# Patient Record
Sex: MALE | Race: WHITE | NOT HISPANIC OR LATINO | Employment: OTHER | URBAN - METROPOLITAN AREA
[De-identification: names, ages, dates, MRNs, and addresses within clinical notes are randomized per-mention and may not be internally consistent; named-entity substitution may affect disease eponyms.]

---

## 2022-09-12 RX ORDER — TRIAMTERENE AND HYDROCHLOROTHIAZIDE 75; 50 MG/1; MG/1
1 TABLET ORAL DAILY
COMMUNITY

## 2022-09-12 RX ORDER — LISINOPRIL 10 MG/1
10 TABLET ORAL DAILY
COMMUNITY

## 2022-09-12 RX ORDER — TRAMADOL HYDROCHLORIDE 50 MG/1
50 TABLET ORAL 2 TIMES DAILY
Status: ON HOLD | COMMUNITY
End: 2022-09-28

## 2022-09-19 NOTE — PROVIDER NOTIFICATION
Discharge plan according to [facility]:       09/12/22 1048   Discharge Planning   Patient/Family Anticipates Transition to home   Living Arrangements   People in Home child(jennifer), adult   Type of Residence Private Residence   Number of Stairs, Within Home, Primary none   Once home, are you able to live on one level? Yes   Bathroom Shower/Tub Walk-in shower   Equipment Currently Used at Home cane, straight  (uses occasionally d/t pain)   Support System   Support Systems Children   Medical Clearance   [facility] Name [facility].

## 2022-09-23 ENCOUNTER — LAB (OUTPATIENT)
Dept: LAB | Facility: CLINIC | Age: 62
End: 2022-09-23
Attending: FAMILY MEDICINE
Payer: COMMERCIAL

## 2022-09-23 DIAGNOSIS — Z01.818 PRE-OPERATIVE GENERAL PHYSICAL EXAMINATION: ICD-10-CM

## 2022-09-23 PROCEDURE — U0005 INFEC AGEN DETEC AMPLI PROBE: HCPCS

## 2022-09-23 PROCEDURE — U0003 INFECTIOUS AGENT DETECTION BY NUCLEIC ACID (DNA OR RNA); SEVERE ACUTE RESPIRATORY SYNDROME CORONAVIRUS 2 (SARS-COV-2) (CORONAVIRUS DISEASE [COVID-19]), AMPLIFIED PROBE TECHNIQUE, MAKING USE OF HIGH THROUGHPUT TECHNOLOGIES AS DESCRIBED BY CMS-2020-01-R: HCPCS

## 2022-09-24 LAB — SARS-COV-2 RNA RESP QL NAA+PROBE: NEGATIVE

## 2022-09-26 ENCOUNTER — ANESTHESIA EVENT (OUTPATIENT)
Dept: SURGERY | Facility: CLINIC | Age: 62
End: 2022-09-26
Payer: COMMERCIAL

## 2022-09-26 NOTE — PROGRESS NOTES
I am evaluating this patient for upcoming Right Direct Anterior Total Hip Arthroplasty with Dr. Baugh at [facility] on 9/27/22:    - Reviewed preop H&P and labs: cleared by PCP for surgery. PMH significant for:   1) Hypertension- appears well-controlled on lisinopril and triamterene-hydrochlorothiazide (patient instructed to hold both of these medications on the morning of surgery).   2) Hypercholesterolemia: on atorvastatin.   3) Morbid Obesity- BMI 43.2, Wt: 328 lbs at preop exam.  Recommend continued efforts at safe weight loss following recovery from this surgery. If patient is interested in further assistance with weight loss, please consider referral to [facility]. They offer both non-surgical and surgical evidence-based weight loss strategies. Call [phone] to schedule a consultation to learn more.      Patient appears medically optimized for surgery. Discharge Plan below.    Discharge plan according to [facility]:       09/12/22 1048   Discharge Planning   Patient/Family Anticipates Transition to home   Living Arrangements   People in Home child(jennifer), adult   Type of Residence Private Residence   Number of Stairs, Within Home, Primary none   Once home, are you able to live on one level? Yes   Bathroom Shower/Tub Walk-in shower   Equipment Currently Used at Home cane, straight  (uses occasionally d/t pain)   Support System   Support Systems Children   Medical Clearance   [facility] Name [facility].              NAIMA Shipman, CNP   Advanced Practice Nurse Navigator- Orthopedics  [facility]   Office Phone: [phone]  Direct Fax: [phone]

## 2022-09-27 ENCOUNTER — ANESTHESIA (OUTPATIENT)
Dept: SURGERY | Facility: CLINIC | Age: 62
End: 2022-09-27
Payer: COMMERCIAL

## 2022-09-27 ENCOUNTER — APPOINTMENT (OUTPATIENT)
Dept: RADIOLOGY | Facility: CLINIC | Age: 62
End: 2022-09-27
Attending: PHYSICIAN ASSISTANT
Payer: COMMERCIAL

## 2022-09-27 ENCOUNTER — APPOINTMENT (OUTPATIENT)
Dept: RADIOLOGY | Facility: CLINIC | Age: 62
End: 2022-09-27
Attending: ORTHOPAEDIC SURGERY
Payer: COMMERCIAL

## 2022-09-27 ENCOUNTER — APPOINTMENT (OUTPATIENT)
Dept: PHYSICAL THERAPY | Facility: CLINIC | Age: 62
End: 2022-09-27
Attending: ORTHOPAEDIC SURGERY
Payer: COMMERCIAL

## 2022-09-27 ENCOUNTER — HOSPITAL ENCOUNTER (OUTPATIENT)
Facility: CLINIC | Age: 62
Discharge: HOME | End: 2022-09-28
Attending: ORTHOPAEDIC SURGERY | Admitting: ORTHOPAEDIC SURGERY
Payer: COMMERCIAL

## 2022-09-27 DIAGNOSIS — Z96.641 STATUS POST TOTAL REPLACEMENT OF RIGHT HIP: Primary | ICD-10-CM

## 2022-09-27 PROBLEM — I10 PRIMARY HYPERTENSION: Status: ACTIVE | Noted: 2022-09-27

## 2022-09-27 PROBLEM — M16.11 PRIMARY OSTEOARTHRITIS OF RIGHT HIP: Status: ACTIVE | Noted: 2022-09-27

## 2022-09-27 PROBLEM — E78.2 MIXED HYPERLIPIDEMIA: Status: ACTIVE | Noted: 2022-09-27

## 2022-09-27 PROBLEM — Z96.651 STATUS POST TOTAL RIGHT KNEE REPLACEMENT: Status: ACTIVE | Noted: 2022-09-27

## 2022-09-27 LAB
ABO/RH(D): NORMAL
ANTIBODY SCREEN: NEGATIVE
SPECIMEN EXPIRATION DATE: NORMAL

## 2022-09-27 PROCEDURE — 250N000013 HC RX MED GY IP 250 OP 250 PS 637: Performed by: HOSPITALIST

## 2022-09-27 PROCEDURE — 250N000011 HC RX IP 250 OP 636: Performed by: ANESTHESIOLOGY

## 2022-09-27 PROCEDURE — 250N000009 HC RX 250: Performed by: PHYSICIAN ASSISTANT

## 2022-09-27 PROCEDURE — 258N000003 HC RX IP 258 OP 636: Performed by: NURSE ANESTHETIST, CERTIFIED REGISTERED

## 2022-09-27 PROCEDURE — 258N000003 HC RX IP 258 OP 636: Performed by: ANESTHESIOLOGY

## 2022-09-27 PROCEDURE — 99204 OFFICE O/P NEW MOD 45 MIN: CPT | Performed by: HOSPITALIST

## 2022-09-27 PROCEDURE — 250N000009 HC RX 250: Performed by: NURSE ANESTHETIST, CERTIFIED REGISTERED

## 2022-09-27 PROCEDURE — 250N000011 HC RX IP 250 OP 636: Performed by: NURSE ANESTHETIST, CERTIFIED REGISTERED

## 2022-09-27 PROCEDURE — 250N000011 HC RX IP 250 OP 636: Performed by: PHYSICIAN ASSISTANT

## 2022-09-27 PROCEDURE — 999N000065 XR PELVIS AND HIP PORTABLE RIGHT 1 VIEW

## 2022-09-27 PROCEDURE — 999N000180 XR SURGERY CARM FLUORO LESS THAN 5 MIN: Mod: TC

## 2022-09-27 PROCEDURE — 250N000009 HC RX 250: Performed by: ANESTHESIOLOGY

## 2022-09-27 PROCEDURE — 999N000141 HC STATISTIC PRE-PROCEDURE NURSING ASSESSMENT: Performed by: ORTHOPAEDIC SURGERY

## 2022-09-27 PROCEDURE — 258N000003 HC RX IP 258 OP 636: Performed by: PHYSICIAN ASSISTANT

## 2022-09-27 PROCEDURE — 370N000017 HC ANESTHESIA TECHNICAL FEE, PER MIN: Performed by: ORTHOPAEDIC SURGERY

## 2022-09-27 PROCEDURE — 272N000001 HC OR GENERAL SUPPLY STERILE: Performed by: ORTHOPAEDIC SURGERY

## 2022-09-27 PROCEDURE — 710N000010 HC RECOVERY PHASE 1, LEVEL 2, PER MIN: Performed by: ORTHOPAEDIC SURGERY

## 2022-09-27 PROCEDURE — C1713 ANCHOR/SCREW BN/BN,TIS/BN: HCPCS | Performed by: ORTHOPAEDIC SURGERY

## 2022-09-27 PROCEDURE — 97110 THERAPEUTIC EXERCISES: CPT | Mod: GP

## 2022-09-27 PROCEDURE — 250N000013 HC RX MED GY IP 250 OP 250 PS 637: Performed by: PHYSICIAN ASSISTANT

## 2022-09-27 PROCEDURE — 86901 BLOOD TYPING SEROLOGIC RH(D): CPT | Performed by: PHYSICIAN ASSISTANT

## 2022-09-27 PROCEDURE — 36415 COLL VENOUS BLD VENIPUNCTURE: CPT | Performed by: PHYSICIAN ASSISTANT

## 2022-09-27 PROCEDURE — 250N000009 HC RX 250: Performed by: ORTHOPAEDIC SURGERY

## 2022-09-27 PROCEDURE — 99205 OFFICE O/P NEW HI 60 MIN: CPT | Performed by: NURSE PRACTITIONER

## 2022-09-27 PROCEDURE — 97116 GAIT TRAINING THERAPY: CPT | Mod: GP

## 2022-09-27 PROCEDURE — C1776 JOINT DEVICE (IMPLANTABLE): HCPCS | Performed by: ORTHOPAEDIC SURGERY

## 2022-09-27 PROCEDURE — 97161 PT EVAL LOW COMPLEX 20 MIN: CPT | Mod: GP

## 2022-09-27 PROCEDURE — 360N000084 HC SURGERY LEVEL 4 W/ FLUORO, PER MIN: Performed by: ORTHOPAEDIC SURGERY

## 2022-09-27 PROCEDURE — 258N000001 HC RX 258: Performed by: ORTHOPAEDIC SURGERY

## 2022-09-27 DEVICE — PINNACLE GRIPTION ACETABULAR SHELL SECTOR 58MM OD
Type: IMPLANTABLE DEVICE | Site: HIP | Status: FUNCTIONAL
Brand: PINNACLE GRIPTION

## 2022-09-27 DEVICE — PINNACLE CANCELLOUS BONE SCREW 6.5MM X 25MM
Type: IMPLANTABLE DEVICE | Site: HIP | Status: FUNCTIONAL
Brand: PINNACLE

## 2022-09-27 DEVICE — BIOLOX DELTA CERAMIC FEMORAL HEAD +8.5 36MM DIA 12/14 TAPER
Type: IMPLANTABLE DEVICE | Site: HIP | Status: FUNCTIONAL
Brand: BIOLOX DELTA

## 2022-09-27 DEVICE — PINNACLE CANCELLOUS BONE SCREW 6.5MM X 20MM
Type: IMPLANTABLE DEVICE | Site: HIP | Status: FUNCTIONAL
Brand: PINNACLE

## 2022-09-27 DEVICE — ACTIS DUOFIX HIP PROSTHESIS (FEMORAL STEM 12/14 TAPER CEMENTLESS SIZE 8 STD COLLAR)  CE
Type: IMPLANTABLE DEVICE | Site: HIP | Status: FUNCTIONAL
Brand: ACTIS

## 2022-09-27 DEVICE — PINNACLE HIP SOLUTIONS ALTRX POLYETHYLENE ACETABULAR LINER +4 NEUTRAL 36MM ID 58MM OD
Type: IMPLANTABLE DEVICE | Site: HIP | Status: FUNCTIONAL
Brand: PINNACLE ALTRX

## 2022-09-27 RX ORDER — ACETAMINOPHEN 500 MG
500 TABLET ORAL EVERY 4 HOURS PRN
Status: ON HOLD | COMMUNITY
End: 2022-09-28

## 2022-09-27 RX ORDER — ACETAMINOPHEN 325 MG/1
975 TABLET ORAL EVERY 8 HOURS
Status: DISCONTINUED | OUTPATIENT
Start: 2022-09-27 | End: 2022-09-28 | Stop reason: HOSPADM

## 2022-09-27 RX ORDER — TRANEXAMIC ACID 650 MG/1
1950 TABLET ORAL ONCE
Status: COMPLETED | OUTPATIENT
Start: 2022-09-27 | End: 2022-09-27

## 2022-09-27 RX ORDER — GABAPENTIN 300 MG/1
300 CAPSULE ORAL AT BEDTIME
Status: DISCONTINUED | OUTPATIENT
Start: 2022-09-27 | End: 2022-09-27

## 2022-09-27 RX ORDER — ONDANSETRON 2 MG/ML
4 INJECTION INTRAMUSCULAR; INTRAVENOUS EVERY 6 HOURS PRN
Status: DISCONTINUED | OUTPATIENT
Start: 2022-09-27 | End: 2022-09-28 | Stop reason: HOSPADM

## 2022-09-27 RX ORDER — CELECOXIB 200 MG/1
400 CAPSULE ORAL ONCE
Status: COMPLETED | OUTPATIENT
Start: 2022-09-27 | End: 2022-09-27

## 2022-09-27 RX ORDER — KETOROLAC TROMETHAMINE 10 MG/1
10 TABLET, FILM COATED ORAL EVERY 8 HOURS
Qty: 6 TABLET | Refills: 0 | Status: SHIPPED | OUTPATIENT
Start: 2022-09-27 | End: 2022-09-29

## 2022-09-27 RX ORDER — BUPIVACAINE HYDROCHLORIDE 5 MG/ML
INJECTION, SOLUTION EPIDURAL; INTRACAUDAL
Status: DISCONTINUED
Start: 2022-09-27 | End: 2022-09-27 | Stop reason: HOSPADM

## 2022-09-27 RX ORDER — HYDROMORPHONE HCL IN WATER/PF 6 MG/30 ML
0.2 PATIENT CONTROLLED ANALGESIA SYRINGE INTRAVENOUS EVERY 5 MIN PRN
Status: DISCONTINUED | OUTPATIENT
Start: 2022-09-27 | End: 2022-09-27 | Stop reason: HOSPADM

## 2022-09-27 RX ORDER — MULTIPLE VITAMINS W/ MINERALS TAB 9MG-400MCG
1 TAB ORAL DAILY
COMMUNITY

## 2022-09-27 RX ORDER — KETAMINE HYDROCHLORIDE 10 MG/ML
INJECTION INTRAMUSCULAR; INTRAVENOUS PRN
Status: DISCONTINUED | OUTPATIENT
Start: 2022-09-27 | End: 2022-09-27

## 2022-09-27 RX ORDER — ACETAMINOPHEN 325 MG/1
650 TABLET ORAL EVERY 4 HOURS PRN
Qty: 100 TABLET | Refills: 0 | Status: SHIPPED | OUTPATIENT
Start: 2022-09-27

## 2022-09-27 RX ORDER — NALOXONE HYDROCHLORIDE 0.4 MG/ML
0.2 INJECTION, SOLUTION INTRAMUSCULAR; INTRAVENOUS; SUBCUTANEOUS
Status: DISCONTINUED | OUTPATIENT
Start: 2022-09-27 | End: 2022-09-28 | Stop reason: HOSPADM

## 2022-09-27 RX ORDER — VITAMIN B COMPLEX
1 TABLET ORAL DAILY
COMMUNITY

## 2022-09-27 RX ORDER — GABAPENTIN 100 MG/1
300 CAPSULE ORAL AT BEDTIME
Qty: 30 CAPSULE | Refills: 0 | Status: SHIPPED | OUTPATIENT
Start: 2022-09-27 | End: 2022-09-28

## 2022-09-27 RX ORDER — KETOROLAC TROMETHAMINE 15 MG/ML
15 INJECTION, SOLUTION INTRAMUSCULAR; INTRAVENOUS EVERY 6 HOURS
Status: COMPLETED | OUTPATIENT
Start: 2022-09-27 | End: 2022-09-28

## 2022-09-27 RX ORDER — DEXAMETHASONE SODIUM PHOSPHATE 10 MG/ML
INJECTION, SOLUTION INTRAMUSCULAR; INTRAVENOUS PRN
Status: DISCONTINUED | OUTPATIENT
Start: 2022-09-27 | End: 2022-09-27

## 2022-09-27 RX ORDER — ACETAMINOPHEN 325 MG/1
975 TABLET ORAL ONCE
Status: DISCONTINUED | OUTPATIENT
Start: 2022-09-27 | End: 2022-09-27 | Stop reason: HOSPADM

## 2022-09-27 RX ORDER — EPHEDRINE SULFATE 50 MG/ML
INJECTION, SOLUTION INTRAMUSCULAR; INTRAVENOUS; SUBCUTANEOUS PRN
Status: DISCONTINUED | OUTPATIENT
Start: 2022-09-27 | End: 2022-09-27

## 2022-09-27 RX ORDER — TRIAMTERENE AND HYDROCHLOROTHIAZIDE 75; 50 MG/1; MG/1
1 TABLET ORAL DAILY
Status: DISCONTINUED | OUTPATIENT
Start: 2022-09-27 | End: 2022-09-28 | Stop reason: HOSPADM

## 2022-09-27 RX ORDER — SODIUM CHLORIDE, SODIUM LACTATE, POTASSIUM CHLORIDE, CALCIUM CHLORIDE 600; 310; 30; 20 MG/100ML; MG/100ML; MG/100ML; MG/100ML
INJECTION, SOLUTION INTRAVENOUS CONTINUOUS
Status: DISCONTINUED | OUTPATIENT
Start: 2022-09-27 | End: 2022-09-27 | Stop reason: HOSPADM

## 2022-09-27 RX ORDER — HYDROMORPHONE HCL IN WATER/PF 6 MG/30 ML
0.2 PATIENT CONTROLLED ANALGESIA SYRINGE INTRAVENOUS
Status: DISCONTINUED | OUTPATIENT
Start: 2022-09-27 | End: 2022-09-28 | Stop reason: HOSPADM

## 2022-09-27 RX ORDER — OXYCODONE HYDROCHLORIDE 5 MG/1
10 TABLET ORAL EVERY 4 HOURS PRN
Status: DISCONTINUED | OUTPATIENT
Start: 2022-09-27 | End: 2022-09-28 | Stop reason: HOSPADM

## 2022-09-27 RX ORDER — OXYCODONE HYDROCHLORIDE 5 MG/1
5 TABLET ORAL EVERY 4 HOURS PRN
Status: DISCONTINUED | OUTPATIENT
Start: 2022-09-27 | End: 2022-09-28 | Stop reason: HOSPADM

## 2022-09-27 RX ORDER — LIDOCAINE 40 MG/G
CREAM TOPICAL
Status: DISCONTINUED | OUTPATIENT
Start: 2022-09-27 | End: 2022-09-28 | Stop reason: HOSPADM

## 2022-09-27 RX ORDER — AMOXICILLIN 250 MG
1 CAPSULE ORAL 2 TIMES DAILY
Status: DISCONTINUED | OUTPATIENT
Start: 2022-09-27 | End: 2022-09-28 | Stop reason: HOSPADM

## 2022-09-27 RX ORDER — OXYCODONE HYDROCHLORIDE 5 MG/1
5 TABLET ORAL EVERY 4 HOURS PRN
Status: DISCONTINUED | OUTPATIENT
Start: 2022-09-27 | End: 2022-09-27 | Stop reason: HOSPADM

## 2022-09-27 RX ORDER — HYDROXYZINE HYDROCHLORIDE 25 MG/1
25 TABLET, FILM COATED ORAL EVERY 6 HOURS PRN
Status: DISCONTINUED | OUTPATIENT
Start: 2022-09-27 | End: 2022-09-28 | Stop reason: HOSPADM

## 2022-09-27 RX ORDER — ONDANSETRON 2 MG/ML
4 INJECTION INTRAMUSCULAR; INTRAVENOUS EVERY 30 MIN PRN
Status: DISCONTINUED | OUTPATIENT
Start: 2022-09-27 | End: 2022-09-27 | Stop reason: HOSPADM

## 2022-09-27 RX ORDER — LISINOPRIL 10 MG/1
10 TABLET ORAL DAILY
Status: DISCONTINUED | OUTPATIENT
Start: 2022-09-27 | End: 2022-09-28 | Stop reason: HOSPADM

## 2022-09-27 RX ORDER — CEFAZOLIN SODIUM/WATER 3 G/30 ML
3 SYRINGE (ML) INTRAVENOUS
Status: COMPLETED | OUTPATIENT
Start: 2022-09-27 | End: 2022-09-27

## 2022-09-27 RX ORDER — MAGNESIUM HYDROXIDE 1200 MG/15ML
LIQUID ORAL PRN
Status: DISCONTINUED | OUTPATIENT
Start: 2022-09-27 | End: 2022-09-27 | Stop reason: HOSPADM

## 2022-09-27 RX ORDER — HYDROXYZINE HYDROCHLORIDE 25 MG/1
25 TABLET, FILM COATED ORAL EVERY 6 HOURS PRN
Qty: 30 TABLET | Refills: 0 | Status: SHIPPED | OUTPATIENT
Start: 2022-09-27

## 2022-09-27 RX ORDER — ASPIRIN 81 MG/1
81 TABLET ORAL 2 TIMES DAILY
Qty: 60 TABLET | Refills: 0 | Status: SHIPPED | OUTPATIENT
Start: 2022-09-27

## 2022-09-27 RX ORDER — AMOXICILLIN 250 MG
1-2 CAPSULE ORAL 2 TIMES DAILY PRN
Qty: 30 TABLET | Refills: 0 | Status: SHIPPED | OUTPATIENT
Start: 2022-09-27

## 2022-09-27 RX ORDER — ACETAMINOPHEN 325 MG/1
650 TABLET ORAL EVERY 4 HOURS PRN
Status: DISCONTINUED | OUTPATIENT
Start: 2022-09-30 | End: 2022-09-28 | Stop reason: HOSPADM

## 2022-09-27 RX ORDER — PROCHLORPERAZINE MALEATE 10 MG
10 TABLET ORAL EVERY 6 HOURS PRN
Status: DISCONTINUED | OUTPATIENT
Start: 2022-09-27 | End: 2022-09-28 | Stop reason: HOSPADM

## 2022-09-27 RX ORDER — ONDANSETRON 2 MG/ML
INJECTION INTRAMUSCULAR; INTRAVENOUS PRN
Status: DISCONTINUED | OUTPATIENT
Start: 2022-09-27 | End: 2022-09-27

## 2022-09-27 RX ORDER — OXYCODONE HYDROCHLORIDE 5 MG/1
5 TABLET ORAL EVERY 4 HOURS PRN
Qty: 26 TABLET | Refills: 0 | Status: SHIPPED | OUTPATIENT
Start: 2022-09-27

## 2022-09-27 RX ORDER — NYSTATIN 100000 [USP'U]/G
POWDER TOPICAL 2 TIMES DAILY PRN
Qty: 60 G | Refills: 0 | Status: SHIPPED | OUTPATIENT
Start: 2022-09-27

## 2022-09-27 RX ORDER — DEXMEDETOMIDINE HYDROCHLORIDE 4 UG/ML
.1-1.2 INJECTION, SOLUTION INTRAVENOUS CONTINUOUS
Status: DISCONTINUED | OUTPATIENT
Start: 2022-09-27 | End: 2022-09-27 | Stop reason: HOSPADM

## 2022-09-27 RX ORDER — MULTIPLE VITAMINS W/ MINERALS TAB 9MG-400MCG
1 TAB ORAL DAILY
Status: DISCONTINUED | OUTPATIENT
Start: 2022-09-27 | End: 2022-09-28 | Stop reason: HOSPADM

## 2022-09-27 RX ORDER — NALOXONE HYDROCHLORIDE 0.4 MG/ML
0.4 INJECTION, SOLUTION INTRAMUSCULAR; INTRAVENOUS; SUBCUTANEOUS
Status: DISCONTINUED | OUTPATIENT
Start: 2022-09-27 | End: 2022-09-28 | Stop reason: HOSPADM

## 2022-09-27 RX ORDER — ONDANSETRON 4 MG/1
4 TABLET, ORALLY DISINTEGRATING ORAL EVERY 6 HOURS PRN
Status: DISCONTINUED | OUTPATIENT
Start: 2022-09-27 | End: 2022-09-28 | Stop reason: HOSPADM

## 2022-09-27 RX ORDER — NAPROXEN 250 MG/1
250 TABLET ORAL DAILY
COMMUNITY

## 2022-09-27 RX ORDER — HYDROMORPHONE HCL IN WATER/PF 6 MG/30 ML
0.4 PATIENT CONTROLLED ANALGESIA SYRINGE INTRAVENOUS
Status: DISCONTINUED | OUTPATIENT
Start: 2022-09-27 | End: 2022-09-28 | Stop reason: HOSPADM

## 2022-09-27 RX ORDER — CEFADROXIL 500 MG/1
500 CAPSULE ORAL 2 TIMES DAILY
Qty: 14 CAPSULE | Refills: 0 | Status: SHIPPED | OUTPATIENT
Start: 2022-09-27

## 2022-09-27 RX ORDER — MAGNESIUM SULFATE 4 G/50ML
4 INJECTION INTRAVENOUS ONCE
Status: COMPLETED | OUTPATIENT
Start: 2022-09-27 | End: 2022-09-27

## 2022-09-27 RX ORDER — LIDOCAINE 40 MG/G
CREAM TOPICAL
Status: DISCONTINUED | OUTPATIENT
Start: 2022-09-27 | End: 2022-09-27 | Stop reason: HOSPADM

## 2022-09-27 RX ORDER — FENTANYL CITRATE 50 UG/ML
INJECTION, SOLUTION INTRAMUSCULAR; INTRAVENOUS PRN
Status: DISCONTINUED | OUTPATIENT
Start: 2022-09-27 | End: 2022-09-27

## 2022-09-27 RX ORDER — BISACODYL 10 MG
10 SUPPOSITORY, RECTAL RECTAL DAILY PRN
Status: DISCONTINUED | OUTPATIENT
Start: 2022-09-27 | End: 2022-09-28 | Stop reason: HOSPADM

## 2022-09-27 RX ORDER — ONDANSETRON 4 MG/1
4 TABLET, ORALLY DISINTEGRATING ORAL EVERY 30 MIN PRN
Status: DISCONTINUED | OUTPATIENT
Start: 2022-09-27 | End: 2022-09-27 | Stop reason: HOSPADM

## 2022-09-27 RX ORDER — BUPIVACAINE HYDROCHLORIDE 5 MG/ML
INJECTION, SOLUTION EPIDURAL; INTRACAUDAL
Status: COMPLETED | OUTPATIENT
Start: 2022-09-27 | End: 2022-09-27

## 2022-09-27 RX ORDER — PROPOFOL 10 MG/ML
INJECTION, EMULSION INTRAVENOUS CONTINUOUS PRN
Status: DISCONTINUED | OUTPATIENT
Start: 2022-09-27 | End: 2022-09-27

## 2022-09-27 RX ORDER — CEFAZOLIN SODIUM 2 G/100ML
2 INJECTION, SOLUTION INTRAVENOUS EVERY 8 HOURS
Status: COMPLETED | OUTPATIENT
Start: 2022-09-27 | End: 2022-09-28

## 2022-09-27 RX ORDER — ASPIRIN 81 MG/1
81 TABLET ORAL 2 TIMES DAILY
Status: DISCONTINUED | OUTPATIENT
Start: 2022-09-27 | End: 2022-09-28 | Stop reason: HOSPADM

## 2022-09-27 RX ORDER — ZINC GLUCONATE 50 MG
50 TABLET ORAL DAILY
COMMUNITY

## 2022-09-27 RX ORDER — MAGNESIUM SULFATE 4 G/50ML
4 INJECTION INTRAVENOUS ONCE
Status: DISCONTINUED | OUTPATIENT
Start: 2022-09-27 | End: 2022-09-27 | Stop reason: HOSPADM

## 2022-09-27 RX ORDER — POLYETHYLENE GLYCOL 3350 17 G/17G
17 POWDER, FOR SOLUTION ORAL DAILY
Status: DISCONTINUED | OUTPATIENT
Start: 2022-09-28 | End: 2022-09-28 | Stop reason: HOSPADM

## 2022-09-27 RX ORDER — FENTANYL CITRATE 50 UG/ML
25 INJECTION, SOLUTION INTRAMUSCULAR; INTRAVENOUS EVERY 5 MIN PRN
Status: DISCONTINUED | OUTPATIENT
Start: 2022-09-27 | End: 2022-09-27 | Stop reason: HOSPADM

## 2022-09-27 RX ORDER — CEFAZOLIN SODIUM/WATER 3 G/30 ML
3 SYRINGE (ML) INTRAVENOUS SEE ADMIN INSTRUCTIONS
Status: DISCONTINUED | OUTPATIENT
Start: 2022-09-27 | End: 2022-09-27 | Stop reason: HOSPADM

## 2022-09-27 RX ORDER — SODIUM CHLORIDE, SODIUM LACTATE, POTASSIUM CHLORIDE, CALCIUM CHLORIDE 600; 310; 30; 20 MG/100ML; MG/100ML; MG/100ML; MG/100ML
INJECTION, SOLUTION INTRAVENOUS CONTINUOUS
Status: DISCONTINUED | OUTPATIENT
Start: 2022-09-27 | End: 2022-09-28 | Stop reason: HOSPADM

## 2022-09-27 RX ADMIN — Medication 1 CAPSULE: at 19:55

## 2022-09-27 RX ADMIN — MULTIPLE VITAMINS W/ MINERALS TAB 1 TABLET: TAB at 15:50

## 2022-09-27 RX ADMIN — ASPIRIN 81 MG: 81 TABLET, COATED ORAL at 20:01

## 2022-09-27 RX ADMIN — SENNOSIDES AND DOCUSATE SODIUM 1 TABLET: 50; 8.6 TABLET ORAL at 20:00

## 2022-09-27 RX ADMIN — Medication 5 MG: at 09:54

## 2022-09-27 RX ADMIN — CEFAZOLIN SODIUM 2 G: 2 INJECTION, SOLUTION INTRAVENOUS at 23:51

## 2022-09-27 RX ADMIN — SODIUM CHLORIDE, POTASSIUM CHLORIDE, SODIUM LACTATE AND CALCIUM CHLORIDE: 600; 310; 30; 20 INJECTION, SOLUTION INTRAVENOUS at 13:04

## 2022-09-27 RX ADMIN — BUPIVACAINE HYDROCHLORIDE 3 ML: 5 INJECTION, SOLUTION EPIDURAL; INTRACAUDAL; PERINEURAL at 07:57

## 2022-09-27 RX ADMIN — Medication 10 MG: at 09:40

## 2022-09-27 RX ADMIN — PHENYLEPHRINE HYDROCHLORIDE 100 MCG: 10 INJECTION INTRAVENOUS at 08:23

## 2022-09-27 RX ADMIN — DEXAMETHASONE SODIUM PHOSPHATE 10 MG: 10 INJECTION, SOLUTION INTRAMUSCULAR; INTRAVENOUS at 08:46

## 2022-09-27 RX ADMIN — MAGNESIUM SULFATE HEPTAHYDRATE 4 G: 80 INJECTION, SOLUTION INTRAVENOUS at 06:44

## 2022-09-27 RX ADMIN — FENTANYL CITRATE 50 MCG: 50 INJECTION, SOLUTION INTRAMUSCULAR; INTRAVENOUS at 07:50

## 2022-09-27 RX ADMIN — KETAMINE HYDROCHLORIDE 30 MG: 10 INJECTION, SOLUTION INTRAMUSCULAR; INTRAVENOUS at 08:04

## 2022-09-27 RX ADMIN — Medication 5 MG: at 10:09

## 2022-09-27 RX ADMIN — MIDAZOLAM 2 MG: 1 INJECTION INTRAMUSCULAR; INTRAVENOUS at 07:40

## 2022-09-27 RX ADMIN — KETOROLAC TROMETHAMINE 15 MG: 15 INJECTION, SOLUTION INTRAMUSCULAR; INTRAVENOUS at 19:55

## 2022-09-27 RX ADMIN — PROPOFOL 100 MCG/KG/MIN: 10 INJECTION, EMULSION INTRAVENOUS at 07:55

## 2022-09-27 RX ADMIN — CEFAZOLIN SODIUM 2 G: 2 INJECTION, SOLUTION INTRAVENOUS at 15:49

## 2022-09-27 RX ADMIN — PHENYLEPHRINE HYDROCHLORIDE 0.5 MCG/KG/MIN: 10 INJECTION INTRAVENOUS at 07:43

## 2022-09-27 RX ADMIN — FENTANYL CITRATE 50 MCG: 50 INJECTION, SOLUTION INTRAMUSCULAR; INTRAVENOUS at 07:45

## 2022-09-27 RX ADMIN — Medication 5 MG: at 10:13

## 2022-09-27 RX ADMIN — KETOROLAC TROMETHAMINE 15 MG: 15 INJECTION, SOLUTION INTRAMUSCULAR; INTRAVENOUS at 23:51

## 2022-09-27 RX ADMIN — ACETAMINOPHEN 975 MG: 325 TABLET, FILM COATED ORAL at 22:12

## 2022-09-27 RX ADMIN — DEXMEDETOMIDINE HYDROCHLORIDE 0.3 MCG/KG/HR: 400 INJECTION INTRAVENOUS at 07:31

## 2022-09-27 RX ADMIN — SODIUM CHLORIDE, POTASSIUM CHLORIDE, SODIUM LACTATE AND CALCIUM CHLORIDE: 600; 310; 30; 20 INJECTION, SOLUTION INTRAVENOUS at 08:36

## 2022-09-27 RX ADMIN — CELECOXIB 400 MG: 200 CAPSULE ORAL at 06:04

## 2022-09-27 RX ADMIN — TRANEXAMIC ACID 1950 MG: 650 TABLET ORAL at 06:04

## 2022-09-27 RX ADMIN — ACETAMINOPHEN 975 MG: 325 TABLET, FILM COATED ORAL at 13:46

## 2022-09-27 RX ADMIN — OXYCODONE HYDROCHLORIDE 5 MG: 5 TABLET ORAL at 22:12

## 2022-09-27 RX ADMIN — SODIUM CHLORIDE, POTASSIUM CHLORIDE, SODIUM LACTATE AND CALCIUM CHLORIDE: 600; 310; 30; 20 INJECTION, SOLUTION INTRAVENOUS at 06:44

## 2022-09-27 RX ADMIN — Medication 3 G: at 07:40

## 2022-09-27 RX ADMIN — ONDANSETRON 4 MG: 2 INJECTION INTRAMUSCULAR; INTRAVENOUS at 09:31

## 2022-09-27 RX ADMIN — MAGNESIUM SULFATE HEPTAHYDRATE 4 G: 80 INJECTION, SOLUTION INTRAVENOUS at 09:58

## 2022-09-27 ASSESSMENT — ACTIVITIES OF DAILY LIVING (ADL)
ADLS_ACUITY_SCORE: 32
ADLS_ACUITY_SCORE: 32
ADLS_ACUITY_SCORE: 36
ADLS_ACUITY_SCORE: 32
ADLS_ACUITY_SCORE: 36
ADLS_ACUITY_SCORE: 32
ADLS_ACUITY_SCORE: 36
ADLS_ACUITY_SCORE: 32
ADLS_ACUITY_SCORE: 32

## 2022-09-27 NOTE — ANESTHESIA PREPROCEDURE EVALUATION
Anesthesia Pre-Procedure Evaluation    Patient: Artemio Brunson   MRN: [id number] : 1960        Procedure : Procedure(s):  RIGHT DIRECT ANTERIOR TOTAL HIP ARTHROPLASTY          Past Medical History:   Diagnosis Date     Arthritis      Hay fever      Hypertension      Irregular heart beat     PVCs     Motion sickness       History reviewed. No pertinent surgical history.   Allergies   Allergen Reactions     Animal Dander      Pollen Extract       Social History     Tobacco Use     Smoking status: Never Smoker     Smokeless tobacco: Never Used   Substance Use Topics     Alcohol use: Yes     Comment: 2-4 tray./week      Wt Readings from Last 1 Encounters:   22 147.3 kg (324 lb 12.8 oz)        Anesthesia Evaluation            ROS/MED HX  ENT/Pulmonary:     (+) CARLYN risk factors, snores loudly, hypertension, obese,     Neurologic:  - neg neurologic ROS     Cardiovascular:     (+) Dyslipidemia hypertension-----Irregular Heartbeat/Palpitations,     METS/Exercise Tolerance:     Hematologic:  - neg hematologic  ROS     Musculoskeletal:  - neg musculoskeletal ROS     GI/Hepatic:    (-) GERD   Renal/Genitourinary:  - neg Renal ROS     Endo:     (+) Obesity (BMI 43),     Psychiatric/Substance Use:  - neg psychiatric ROS     Infectious Disease:  - neg infectious disease ROS     Malignancy:       Other:  - neg other ROS          Physical Exam    Airway        Mallampati: I   TM distance: > 3 FB   Neck ROM: full   Mouth opening: > 3 cm    Respiratory Devices and Support         Dental  no notable dental history         Cardiovascular   cardiovascular exam normal       Rhythm and rate: regular and normal     Pulmonary   pulmonary exam normal        breath sounds clear to auscultation           OUTSIDE LABS:  CBC:   Lab Results   Component Value Date    WBC 6.5 2009    HGB 14.9 2009    HCT 43.2 2009     2009     BMP:   Lab Results   Component Value Date     2009    POTASSIUM  4.2 08/20/2009    CHLORIDE 104 08/20/2009    CO2 26 08/20/2009    BUN 20 08/20/2009    CR 1.00 08/20/2009    GLC 93 08/20/2009     COAGS: No results found for: PTT, INR, FIBR  POC: No results found for: BGM, HCG, HCGS  HEPATIC: No results found for: ALBUMIN, PROTTOTAL, ALT, AST, GGT, ALKPHOS, BILITOTAL, BILIDIRECT, LOUISE  OTHER:   Lab Results   Component Value Date    CHELSIE 9.4 08/20/2009       Anesthesia Plan    ASA Status:  3      Anesthesia Type: Spinal.         Techniques and Equipment:       - Drips/Meds: Dexmed. infusion, Ketamine     Consents    Anesthesia Plan(s) and associated risks, benefits, and realistic alternatives discussed. Questions answered and patient/representative(s) expressed understanding.    - Discussed:     - Discussed with:  Patient         Postoperative Care    Pain management: IV analgesics, Multi-modal analgesia.   PONV prophylaxis: Ondansetron (or other 5HT-3)     Comments:                Portillo Garcia MD

## 2022-09-27 NOTE — CONSULTS
[facility] ACUTE PAIN SERVICE CONSULTATION     Date of Admission:  9/27/2022  Date of Consult (When I saw the patient): 09/27/22  Physician requesting consult: Mia Scherer   Reason for consult: medical cannabis      Assessment/Plan:     Artemio Brunson is a 61 year old male who was admitted on 9/27/2022.  Pain team was asked to see the patient for medical cannabis. Admitted for planned procedure. History of Hypertension, Hypercholesterolemia: on atorvastatin, Morbid Obesity- BMI 43.2, Wt: 328 lbs at preop exam. Reports on medical cannabis at home, takes at night for sleep. Also on Tramadol for chronic OA pain.  Describes pain as 2-4/10 and aching. The patient denies nausea, vomiting, constipation, diarrhea, chest pain, shortness of breath. The patient does not smoke and denies chemical dependency history.     Post op day: Day of Surgery. Right Direct Anterior Total Hip Arthroplasty with Dr. Baugh at [facility] on 9/27/22    Opioid Induced Respiratory Depression Risk Assessment:?moderate - high   (Low 0-1; Moderate 2-4; or High >4 or >/= 3 if two of the risk factors are age > 60 and opioid naive) due to the following risk factors: CARLYN, COPD/Asthma/pulmonary disease, CHF, renal dysfunction, hepatic dysfunction, Obesity, Smoker, Age>60, >2 opioid therapies, concomitant CNS depressants, opioid naive status, or post surgical ?     PLAN:   1) Pain is consistent with post op pain. Labs and imaging indicated: I have personally reviewed pertinent labs, tests, and radiologic imaging in patient's chart. Treatment plan includes multimodal pain approach, Hospital Medicine Service for medical management, Orthopedics, post op cares. Patient educated regarding multimodal pain approach, medications as listed below. Patient is understanding of the plan. All questions and concerns addressed to patient's satisfaction.   2)Multimodal Medication Therapy  Topical: none  NSAID'S: Toradol per Orthopedics 15 mg x4  doses  Muscle Relaxants: none  Adjuvants: APAP tid, Gabapentin 300 mg at bedtime per Orthopedics - discontinue, Hydroxyzine 25 mg q6h prn   Opioids: Oxycodone 5-10 mg q4h prn   IV Pain medication: Dilaudid q2h prn   3)Non-medication interventions: ice, rest, pt ot  4)Constipation Prophylaxis: Scheduled and prn  5) Care Teams: Hospital Medicine Service, Orthopedics     -Opioid prescriber has been [place]  -[place]  pulled from system on 9/27/22. This indicates 4 scripts for Tramadol 50 mg in the past year, last filled 8/25/22 Tramadol 50 mg #60 for 30 d  Discharge Recommendations - We recommend prescribing the following at the time of discharge: per Ortho      History of Present Illness (HPI):       Artemio Brunson is a 61 year old old male who presented for planned procedure.  Past medical history as above. The pain is reported to be acute knee pain, chronic pain. Current pain is rated a 2-4/10 and goal is 0/10.      Per [place]  review, the patient does have an opioid tolerance. Opioid induced side effects noted and include: none.      Reviewed medical record, labs, imaging, ED note, and care everywhere.     Past pain treatments have included: APAP, Naproxen, Tramadol     Medical History   PAST MEDICAL HISTORY:   Past Medical History:   Diagnosis Date     Arthritis      Hay fever      Hypertension      Irregular heart beat     PVCs     Motion sickness        PAST SURGICAL HISTORY: History reviewed. No pertinent surgical history.    FAMILY HISTORY: History reviewed. No pertinent family history.    SOCIAL HISTORY:   Social History     Tobacco Use     Smoking status: Never Smoker     Smokeless tobacco: Never Used   Substance Use Topics     Alcohol use: Yes     Comment: 2-4 tray./week        [facility]  Tobacco:  reports that he has never smoked. He has never used smokeless tobacco.  Alcohol:  reports current alcohol use.  Illicit drugs:  reports current drug use. Drug:  "Marijuana.    Allergies  Allergies   Allergen Reactions     Animal Dander      Pollen Extract        Problem List  There are no problems to display for this patient.      Prior to Admission Medications   Medications Prior to Admission   Medication Sig Dispense Refill Last Dose     acetaminophen (TYLENOL) 500 MG tablet Take 500 mg by mouth every 4 hours as needed for mild pain   9/27/2022 at 0400     Bromelain 250 MG CAPS Take 250 mg by mouth daily   Past Week at Unknown time     lisinopril (ZESTRIL) 10 MG tablet Take 10 mg by mouth daily   9/26/2022 at Unknown time     multivitamin w/minerals (THERA-VIT-M) tablet Take 1 tablet by mouth daily   Past Week at Unknown time     naproxen (NAPROSYN) 250 MG tablet Take 250 mg by mouth daily   9/20/2022     psyllium (METAMUCIL/KONSYL) capsule Take 1 capsule by mouth daily   Past Week at Unknown time     Quercetin 250 MG TABS Take 250 mg by mouth daily   Past Week at Unknown time     traMADol (ULTRAM) 50 MG tablet Take 50 mg by mouth 2 times daily   9/27/2022 at 0400     triamterene-HCTZ (MAXZIDE) 75-50 MG tablet Take 1 tablet by mouth daily   9/26/2022 at Unknown time     vitamin B-Complex Take 1 tablet by mouth daily   Past Week at Unknown time     Vitamin D3 (CHOLECALCIFEROL) 25 mcg (1000 units) tablet Take 1 tablet by mouth daily   Past Week at Unknown time     zinc gluconate 50 MG tablet Take 50 mg by mouth daily   Past Week at Unknown time       Review of Systems  Complete ROS reviewed, unless noted in HPI, all other systems reviewed (with patient) and all others found to be negative.      Objective:     Physical Exam:  /67 (BP Location: Right arm)   Pulse 67   Temp (!) 96.7  F (35.9  C) (Axillary)   Resp 18   Ht 1.854 m (6' 1\")   Wt 147.3 kg (324 lb 12.8 oz)   SpO2 98%   BMI 42.85 kg/m    Weight:   Vitals:    09/12/22 0900 09/27/22 0615   Weight: 148.8 kg (328 lb) 147.3 kg (324 lb 12.8 oz)      Body mass index is 42.85 kg/m .    General Appearance:  Alert, " cooperative, no distress, appears stated age    Head:  Normocephalic, without obvious abnormality, atraumatic   Eyes:  PERRL, conjunctiva/corneas clear, EOM's intact   ENT/Throat: Lips, mucosa, and tongue normal; teeth and gums normal   Lymph/Neck: Supple, symmetrical, trachea midline   Lungs:   Clear to auscultation bilaterally, respirations unlabored   Chest Wall:  No tenderness or deformity   Cardiovascular/Heart:  Regular rate and rhythm, S1, S2 normal,no murmur, rub or gallop.    Abdomen:   Soft, non-tender, bowel sounds active all four quadrants,  no masses, no organomegaly   Musculoskeletal: incision is covered   Skin: Skin color, texture, turgor normal, no rashes or lesions   Neurologic: Alert and oriented X 3, Moves all 4 extremities     Psych: Affect is appropriate      Imaging: Reviewed I have personally reviewed pertinent labs, tests, and radiologic imaging in patient's chart.      Labs: Reviewed I have personally reviewed pertinent labs, tests, and radiologic imaging in patient's chart.        Total time spent 65 minutes with greater than 50% in consultation, education and coordination of care.     Also discussed with RN, Orthopedics        Thank you for this consultation.    Zoe MCNAMARA FNP-C  Acute Care Pain Management Program  [facility] ([place], [place], [place])  Monday-Friday 8a-4p   Page via online paging system or call [phone]

## 2022-09-27 NOTE — PLAN OF CARE
"  Problem: Plan of Care - These are the overarching goals to be used throughout the patient stay.    Goal: Plan of Care Review/Shift Note  Description: The Plan of Care Review/Shift note should be completed every shift.  The Outcome Evaluation is a brief statement about your assessment that the patient is improving, declining, or no change.  This information will be displayed automatically on your shift note.  9/27/2022 1428 by Alda Vasquez RN  Outcome: Ongoing, Progressing  9/27/2022 1427 by Alda Vasquez RN  Outcome: Ongoing, Progressing  Goal: Patient-Specific Goal (Individualized)  Description: You can add care plan individualizations to a care plan. Examples of Individualization might be:  \"Parent requests to be called daily at 9am for status\", \"I have a hard time hearing out of my right ear\", or \"Do not touch me to wake me up as it startles me\".  9/27/2022 1428 by Alda Vasquez RN  Outcome: Ongoing, Progressing  9/27/2022 1427 by Alda Vasquez RN  Outcome: Ongoing, Progressing  Goal: Absence of Hospital-Acquired Illness or Injury  9/27/2022 1428 by Alda Vasquez RN  Outcome: Ongoing, Progressing  9/27/2022 1427 by Alda Vasquez RN  Outcome: Ongoing, Progressing  Intervention: Prevent and Manage VTE (Venous Thromboembolism) Risk  Recent Flowsheet Documentation  Taken 9/27/2022 1400 by Alda Vasquez RN  VTE Prevention/Management: foot pump device on  Taken 9/27/2022 1317 by Alda Vasquez RN  Activity Management: bedrest  Goal: Optimal Comfort and Wellbeing  9/27/2022 1428 by Alda Vasquez RN  Outcome: Ongoing, Progressing  9/27/2022 1427 by Alda Vasquez RN  Outcome: Ongoing, Progressing  Intervention: Monitor Pain and Promote Comfort  Recent Flowsheet Documentation  Taken 9/27/2022 1247 by Alda Vasquez RN  Pain Management Interventions:   rest   repositioned  Goal: Readiness for Transition of Care  9/27/2022 1428 by Alda Vasquez RN  Outcome: Ongoing, Progressing  9/27/2022 " 1427 by Alda Vasquez, RN  Outcome: Ongoing, Progressing         Pt landed from PACU around 1245. Vitally stable, on room air. Pt rates pain as 3/10. Pt has not voided yet, and is not passing gas. Pt will work with PT this afternoon. Right hip site is clean, dry and intact, no drainage. Pt denies numbness and tingling. Strict no external rotation of right leg. Pt denies nausea, eating appropriately.     Alda Vasquez RN on 9/27/2022 at 2:29 PM

## 2022-09-27 NOTE — ANESTHESIA PROCEDURE NOTES
Intrathecal injection Procedure Note    Pre-Procedure   Staff -        Anesthesiologist:  Portillo Garcia MD       Performed By: anesthesiologist       Location: OR       Procedure Start/Stop Times: 9/27/2022 7:57 AM and 9/27/2022 8:00 AM       Pre-Anesthestic Checklist: patient identified, risks and benefits discussed, informed consent, monitors and equipment checked and pre-op evaluation  Timeout:       Correct Patient: Yes        Correct Procedure: Yes        Correct Site: Yes        Correct Position: Yes   Procedure Documentation  Procedure: intrathecal injection       Diagnosis: hip replacement       Patient Position: sitting       Patient Prep/Sterile Barriers: sterile gloves, mask, patient draped       Skin prep: Chloraprep       Insertion Site: L3-4. (midline approach).       Needle Gauge: 24.        Needle Length (Inches): 4        Spinal Needle Type: Pencan       Introducer used       Introducer: 20 G       # of attempts: 1 and  # of redirects:  0    Assessment/Narrative         Paresthesias: No.       CSF fluid: clear.    Medication(s) Administered   0.5% Bupivacaine PF (Intrathecal) - Intrathecal   3 mL - 9/27/2022 7:57:00 AM  Medication Administration Time: 9/27/2022 7:57 AM

## 2022-09-27 NOTE — OP NOTE
PATIENT:  Artemio Brunson    DATE OF SURGERY:  9/27/2022     PREOPERATIVE DIAGNOSIS:   1.  Right hip osteoarthritis.   2.  Morbid Obesity BMI 43    POSTOPERATIVE DIAGNOSIS:   1.  Right hip osteoarthritis.   2.  Morbid Obesity BMI 43    PROCEDURE:   1.  Right total hip arthroplasty.   2.   22 modifier will be applied to this case based on increased patient set up time (50% longer), increased surgical case operative time (50% longer), increased surgical case complexity,  and the need for increased postoperative cares based on morbid obesity BMI 43.  More specifically given the size/weight of the patient it took more OR personnel and more time to set patient up, pad all extremities, and secure the patient.  Given the size of the operative limb it took longer to expose the surgical area, it took more complex releases and mobilization of the tissue layers and retractor placement to perform the surgery.   This in turn led to increased operative time both from the exposure part of the procedure, the procedure itself, as well as the increased time and complexity to close the tissue layers and skin.      SURGEON: JUANY YEUNG MD.     ASSISTANT: Annelise Scherer PA-C; ADRIA assist was essential and required for all portions of the case, including patient positioning, soft tissue retraction, patient safety, use of complex orthopedic instrumentation, assistance with closure of the wound, and postoperative care    ANESTHESIA: spinal    EBL: 750 mL    COMPLICATIONS: None    IMPLANTS:   1. DePuy Actis femoral stem, size 8, STD offset, 12/14 trunnion.   2. DePuy Gription acetabular shell, 58 mm outer diameter.   3. DePuy AltrX polyethylene liner, +4mm offset.   4. DePuy Biolox ceramic femoral head, 36 mm outer diameter, +8.5 mm neck length.     INDICATION FOR PROCEDURE: Artemio Brunson is a pleasant 61 year old male with long standing Hip degenerative joint disease.  It failed to respond to conservative management.  The above  procedure was recommended.  For full details please see my clinic notes.  The risks including, but not limited to, bleeding, infection, nerve injury, dvt, implant failure, implant wear, fracture, limb length inequality, anesthetic complications, heart attack, stroke, and even death were discussed.  Pt verbalized understanding.  Informed consent was obtained      DESCRIPTION OF PROCEDURE: The patient was seen and evaluated in the preop area. Discussion of the surgery and any further questions were answered with the patient and family using easily understandable non-medical terms. The correct site was identified with the patient, and I placed my initials at the surgical site. Pre-procedure verification was completed. Relevant information / documentation available, they were reviewed and properly matched to the patient.  I verified the consent was accurate and complete.  I verified that the proper surgical equipment and supplies were available. The patient was taken to the operating room and placed in position according to the procedure in consideration with all extremities well padded.  The patient received preoperative prophylactic antibiotics based on the patient s procedure, BMI, and allergy profile.  A Time Out was conducted just prior to starting procedure to verify the eight required elements: 1-patient identity, 2-consent accurate and complete, 3-position, 4-correct side/site marked (if applicable), 5-procedure, 6-relevant images / results properly labeled and displayed (if applicable), 7-antibiotics / irrigation fluids (if applicable), 8-safety precautions.    The patient was  given successful spinal anesthesia. The patient was then placed in the supine position on the [place] table with all extremities well padded.  The operative  Hip was then prepped and draped in sterile fashion.  The leg was covered with a Ioban type drape.     I made a longitudinal incision over the tensor fascia muscle.  It was located  "approx 1 cm distal and 3 cm lateral to the anterior superior iliac spine.  It was angled slightly posterior  And lateral towards the lateral femoral condyle.  The incision was approx 8cm long and parallels the fibers of the tensor fascia laina muscle.  The subcutanuos tissues were incised with a scalpel.  Once at the tensor fascia it was split longitudinally.  An Adilia type clamp was placed on the anterior aspect. The vessel that perforates the fascia was coagulated with electrocautery.  Blunt finger dissection was performed anteriorly and medially to the tensor fascia muscle.  A Netta retractor was placed anteriorly and blunt Cobra retractors were on the superior lateral and inferior medial femoral neck.  A Mcghee elevator was then used to elevate the ilipsoas muscle and rectus femorus muscles off the anterior hip capsule.  I then identified the lateral femoral circumflex vessels which were then tied off and/or coagulated.  I then performed a \"T\" capsulotomy of the hip capsule, which extended down to the intertrochanteric line.  Each leaf was tagged for later repair.  I then placed the Cobra retractors inside of the hip capsule and a right angle retractor was carefully placed anteriorly to the hip joint, just under the rectus femorus muscle.      We then obtained intraoperative xrays of the pelvis and hips.  The hip was placed under gentle traction and externally rotated 20 degrees. Blunt retractors were placed around the femoral neck to protect the surrounding structures. I then made the femoral neck cut with an oscillating saw as per my preoperative templating and using C-arm verification.  The femoral head was then removed without complication. Traction was then released. Retractors were then placed around the acetabulum.  The labrum was sharply excised.  The capsule was released off the medial and posterior medial neck.      We then began our reaming of the acetabulum by aiming the reamer anterior to posterior and " proximal.  We medialized under C-arm visualization.  We sequentially reamed in 1-2mm increments.  Progress was checked by visualizing the acetabulum, direct palpation of the acetabulum and C-arm fluoroscopy.  We reamed until there was good circumferential fit on C-arm pictures and our approximate templated size.  Careful attentian was paid to make sure we had a true AP pelvis xray.  After we had reamed to the appropriate size I inserted the acetabular component.  It was impacted into position under C-arm fluoroscopic guidance.  It was placed at 45 degrees of inclination and 20 degrees of anteversion.  It had excellent fit and stability.  No additional screw fixation was felt to be needed.      We then turned our attention to femoral preparation.  The bone hook was placed around the posterior femur just distal to the vastus tubercle.  The femur was then gently externally rotated and releases were performed.  The capsule about the greater trochanter was excised to aid in delivering the femur.  The leg was then progressively externally rotated to approximately 120 degrees.   The hip was then adducted and extended using the [place] table.  Once good visualization of the femoral neck was achieved, blunt retractors were placed around the femoral neck.  A rongeur was used to remove excess bone from the lateral neck remnant.  I then began femoral broaching.  I started with the smallest size.  The broach was oriented such that it paralleled the posterior cortex of the femoral neck.  C-arm pictures were taken periodically to make sure the broaches were going down the femur appropriately and not in varus.  Broaching continued until complete stability was achieved throughout the proximal femur.  The appropriate trial neck and head were placed on the broach.  The leg ws brought back up to neutral position, then utilizing the [place] table, the hip reduced.      The leg length, offset, component size and position was then checked with  the C-arm xray.  The pictures were also printed off to compare pre and post position and compare to non-operative side, using an overlay technique.  Once the appropriate neck length and offset were achieved, the hip was then dislocated.   The leg was externally rotated, adducted and extended.  The femoral trials were removed.  The acetabular liner trial was removed.  The appproriate highly cross linked polyethylene liner was then impacted appropriately into the acetabular component.  The appropriate sized femoral component was then impacted into the proximal femur without complication.  The appropriate final head was then impacted onto the stem.  The hip was then reduced utilizing the [place] table.  C-arm pictures were taken again to confirm appropriate leg length, offset, component size and postition.  I externally the hip to 90 degrees and internally rotated to 90 degrees without any instability.  A shuck test was performed without any instability.  The soft tissue tension appeared appropriate.      The hip was then irrigated with antibiotic saline.  The hip capsule was closed with #1 fiberwire sutures. The fascia was closed with #1 vicryl suture.  Subcutaneous layer with 2-O vicryl suture.  Skin with 3-O monocryl suture and Dermabond.  A sterile dressing was placed on the surgical hip.  The sponge and needle counts were correct at the end of the case.  The patient left the operating room in stable condition.      POST OP PLAN:   1) Pain Control:  IV narcotics, transition to oral narcotics as bowel function returns, Toradol, Decadron, Gabapentin, Ice.  2) DVT Prophylaxis: ECASA 81mg PO BID x 1 month, mechanical devices, early ambulation  3) Infection Prophylaxis: IV Abx as per pt's allergy profile and BMI x 24 hrs. Nystatin powder to groin BID x 2 weeks, Cefudroxil 500mg PO BID x 7 days at discharge  4) PT/OT: Eval and Treat as per ANTERIOR APPROACH protocol  5) Medical Cares: Primary medical consult  6) Dispostion:   consult as needed for disposition    Implant Name Type Inv. Item Serial No.  Lot No. LRB No. Used Action   IMP INSERT HIP DEPUY PINNACLE ALTRX 36MM +4 8453- - [id number] Total Joint Component/Insert IMP INSERT HIP DEPUY PINNACLE ALTRX 36MM +4 1221-  [employer] [facility]- [id number] Right 1 Implanted   IMP SHELL ACET DEPUY PINNACLE GRIPTION 58MM [id number] - [id number] Total Joint Component/Insert IMP SHELL ACET DEPUY PINNACLE GRIPTION 58MM [id number]  [employer]- [id number] Right 1 Implanted   IMP SCR BONE CAN ACE 6.5X20MM 1217- - [id number] Metallic Hardware/[place] IMP SCR BONE CAN ACE 6.5X20MM 1217-  [employer]- [id number] Right 1 Implanted   IMP SCR BONE CAN ACE 6.5X25MM 1217- - [id number] Metallic Hardware/[place] IMP SCR BONE CAN ACE 6.5X25MM 1217-  [employer]- [id number] Right 1 Implanted   IMP SCR BONE CAN ACE 6.5X25MM 1217- - [id number] Metallic Hardware/[place] IMP SCR BONE CAN ACE 6.5X25MM 1217-  [employer]- [id number] Right 1 Implanted   IMP HEAD FEMORAL DEPUY CERAMIC 36MM +8MM 1365- - [id number] Total Joint Component/Insert IMP HEAD FEMORAL DEPUY CERAMIC 36MM +8MM 1365-  [employer]- [id number] Right 1 Implanted   IMP STEM FEM DEPUY ACTIS STD COLLAR TPR SZ 8MM 1010- - [id number] Total Joint Component/Insert IMP STEM FEM DEPUY ACTIS STD COLLAR TPR SZ 8MM 1010-  [employer]- [id number] Right 1 Implanted         José Baugh MD  9/27/2022  9:40 AM      @C(1)@  José Baugh MD    @C(2)@  Toney Neal

## 2022-09-27 NOTE — ANESTHESIA POSTPROCEDURE EVALUATION
Patient: Artemio Brunson    Procedure: Procedure(s):  RIGHT DIRECT ANTERIOR TOTAL HIP ARTHROPLASTY       Anesthesia Type:  Spinal    Note:  Disposition: Inpatient   Postop Pain Control: Uneventful            Sign Out: Well controlled pain   PONV: No   Neuro/Psych: Uneventful            Sign Out: Acceptable/Baseline neuro status   Airway/Respiratory: Uneventful            Sign Out: Acceptable/Baseline resp. status   CV/Hemodynamics: Uneventful            Sign Out: Acceptable CV status; No obvious hypovolemia; No obvious fluid overload   Other NRE: NONE   DID A NON-ROUTINE EVENT OCCUR? No           Last vitals:  Vitals Value Taken Time   BP 98/60 09/27/22 1130   Temp 36.6  C (97.8  F) 09/27/22 1012   Pulse 82 09/27/22 1149   Resp 18 09/27/22 1100   SpO2 100 % 09/27/22 1149   Vitals shown include unvalidated device data.    Electronically Signed By: Portillo Garcia MD  September 27, 2022  11:51 AM

## 2022-09-27 NOTE — PHARMACY-ADMISSION MEDICATION HISTORY
Pharmacy Note - Admission Medication History    Pertinent Provider Information:    ______________________________________________________________________    Prior To Admission (PTA) med list completed and updated in EMR.       PTA Med List   Medication Sig Last Dose     acetaminophen (TYLENOL) 500 MG tablet Take 500 mg by mouth every 4 hours as needed for mild pain 9/27/2022 at 0400     Bromelain 250 MG CAPS Take 250 mg by mouth daily Past Week at Unknown time     lisinopril (ZESTRIL) 10 MG tablet Take 10 mg by mouth daily 9/26/2022 at Unknown time     multivitamin w/minerals (THERA-VIT-M) tablet Take 1 tablet by mouth daily Past Week at Unknown time     naproxen (NAPROSYN) 250 MG tablet Take 250 mg by mouth daily 9/20/2022     psyllium (METAMUCIL/KONSYL) capsule Take 1 capsule by mouth daily Past Week at Unknown time     Quercetin 250 MG TABS Take 250 mg by mouth daily Past Week at Unknown time     traMADol (ULTRAM) 50 MG tablet Take 50 mg by mouth 2 times daily 9/27/2022 at 0400     triamterene-HCTZ (MAXZIDE) 75-50 MG tablet Take 1 tablet by mouth daily 9/26/2022 at Unknown time     vitamin B-Complex Take 1 tablet by mouth daily Past Week at Unknown time     Vitamin D3 (CHOLECALCIFEROL) 25 mcg (1000 units) tablet Take 1 tablet by mouth daily Past Week at Unknown time     zinc gluconate 50 MG tablet Take 50 mg by mouth daily Past Week at Unknown time       Information source(s): Patient, Facility ([place] [facility]/) medication list/MAR and CareEverywhere/SureScripts    Method of interview communication: in-person    Patient was asked about OTC/herbal products specifically.  PTA med list reflects this.    Based on the pharmacist's assessment, the PTA med list information appears reliable    Allergies were reviewed, assessed, and updated with the patient.      Patient did not bring any medications to the hospital and can't retrieve from home. No multi-dose medications are available for use during hospital stay.      Thank  you for the opportunity to participate in the care of this patient.      Pankaj Royal [facility]     9/27/2022     6:48 AM

## 2022-09-27 NOTE — ANESTHESIA CARE TRANSFER NOTE
Patient: Artemio Brunson    Procedure: Procedure(s):  RIGHT DIRECT ANTERIOR TOTAL HIP ARTHROPLASTY       Diagnosis: Hip osteoarthritis [M16.9]  Right hip pain [M25.551]  Diagnosis Additional Information: No value filed.    Anesthesia Type:   Spinal     Note:    Oropharynx: oropharynx clear of all foreign objects and spontaneously breathing  Level of Consciousness: awake  Oxygen Supplementation: face mask  Level of Supplemental Oxygen (L/min / FiO2): 8      Vital Signs Stable: post-procedure vital signs reviewed and stable  Report to RN Given: handoff report given  Patient transferred to: PACU    Handoff Report: Set expectations for post-procedure period      Vitals:  Vitals Value Taken Time   BP 92/53 09/27/22 1014   Temp 97.8    Pulse 65 09/27/22 1014   Resp 15 09/27/22 1014   SpO2 100 % 09/27/22 1014   Vitals shown include unvalidated device data.    Electronically Signed By: NAIMA Terry CRNA  September 27, 2022  10:15 AM

## 2022-09-27 NOTE — PROGRESS NOTES
[facility]      OUTPATIENT PHYSICAL THERAPY EVALUATION  PLAN OF TREATMENT FOR OUTPATIENT REHABILITATION  (COMPLETE FOR INITIAL CLAIMS ONLY)  Patient's Last Name, First Name, M.I.  YOB: 1960  Artemio Brunson                        Provider's Name  [facility] Medical Record No.  [id number]                               Onset Date:  09/27/22   Start of Care Date:         Type:     _X_PT   ___OT   ___SLP Medical Diagnosis:                           PT Diagnosis:  impaired functional mobility   [place] [facility] [place]   _________________________________________________________________________________  Plan of Treatment/Functional Goals    Planned Interventions: gait training, home exercise program, patient/family education, stair training, transfer training     Goals: See Physical Therapy Goals on Care Plan in [facility] electronic health record.    Therapy Frequency: Daily  Predicted Duration of Therapy Intervention: 09/29/22  _________________________________________________________________________________    I CERTIFY THE NEED FOR THESE SERVICES FURNISHED UNDER        THIS PLAN OF TREATMENT AND WHILE UNDER MY CARE     (Physician co-signature of this document indicates review and certification of the therapy plan).              Certification date from: (P) 09/27/22,      Referring Physician: José Baugh MD            Initial Assessment        See Physical Therapy evaluation dated   in Epic electronic health record.

## 2022-09-27 NOTE — INTERVAL H&P NOTE
"I have reviewed the surgical (or preoperative) H&P that is linked to this encounter, and examined the patient. There are no significant changes    Clinical Conditions Present on Arrival:  Clinically Significant Risk Factors Present on Admission                   # Severe Obesity: Estimated body mass index is 42.85 kg/m  as calculated from the following:    Height as of this encounter: 1.854 m (6' 1\").    Weight as of this encounter: 147.3 kg (324 lb 12.8 oz).       "

## 2022-09-27 NOTE — PROGRESS NOTES
I was notified that in the PACU the patient had his postoperative x-rays and this showed the total hip arthroplasty device was subluxed anteriorly.  It was noted intraoperatively using C-arm fluoroscopy as well as direct vision that the total hip arthroplasty device was well located with good stability.    The patient stated that he did feel some type of clunk when they were moving him to do the x-ray.    I then evaluated the patient.  His incision and dressing are clean dry and intact.  I maneuvered the leg both internal and external rotation with slight traction.  At this time the hip moves smoothly and there was no mechanical symptoms appreciated.  His limb lengths were equal on exam.  He was able to wiggle his toes and dorsiflex and plantarflex both ankles symmetrically.  The right leg was was able to be placed in a normal position with the toes pointing straight up.    We then took a new postoperative x-ray, both AP and lateral.  This showed that the total hip arthroplasty device was well located.  There is no obvious fractures.    At this point in my opinion we can proceed with normal postoperative course.  I did place extra orders in the physical therapy and activity orders stating that we should avoid any external rotation of the right leg at this time.  The patient can be weightbearing as tolerated with a walker.

## 2022-09-27 NOTE — PROGRESS NOTES
09/27/22 1500   Quick Adds   Quick Adds Certification   Type of Visit Initial PT Evaluation   Living Environment   People in Home child(jennifer), dependent   Current Living Arrangements house   Home Accessibility stairs within home   Number of Stairs, Main Entrance 3   Stair Railings, Main Entrance none  (does have trash cans to hold onto)   Transportation Anticipated family or friend will provide   Self-Care   Usual Activity Tolerance good   Current Activity Tolerance moderate   Equipment Currently Used at Home cane, straight   Fall history within last six months no   General Information   Onset of Illness/Injury or Date of Surgery 09/27/22   Referring Physician José Baugh MD   Patient/Family Therapy Goals Statement (PT) Go home   Pertinent History of Current Problem (include personal factors and/or comorbidities that impact the POC) S/P R VIOLETA   Existing Precautions/Restrictions no hip ER   Weight-Bearing Status - LLE full weight-bearing   Weight-Bearing Status - RLE weight-bearing as tolerated   Bed Mobility   Bed Mobility supine-sit   Bed Mobility Limitations decreased ability to use legs for bridging/pushing  (No hip ER)   Assistive Device (Bed Mobility) bed rails   Transfers   Transfers sit-stand transfer   Maintains Weight-bearing Status (Transfers) able to maintain   Sit-Stand Transfer   Sit-Stand Fisher (Transfers) verbal cues;contact guard   Assistive Device (Sit-Stand Transfers) walker, front-wheeled   Gait/Stairs (Locomotion)   Fisher Level (Gait) verbal cues;contact guard   Assistive Device (Gait) walker, front-wheeled   Distance in Feet (Required for LE Total Joints) 125  (eval for 1st 10ft)   Pattern (Gait) swing-through   Deviations/Abnormal Patterns (Gait) antalgic   Maintains Weight-bearing Status (Gait) able to maintain   Negotiation (Stairs) stairs independence;handrail location;number of steps   Fisher Level (Stairs) verbal cues;contact guard   Handrail Location (Stairs)  both sides   Number of Steps (Stairs) 4   Clinical Impression   Criteria for Skilled Therapeutic Intervention Yes, treatment indicated   PT Diagnosis (PT) impaired functional mobility   Influenced by the following impairments Weakness, pain   Functional limitations due to impairments gait, Transfers, stairs   Clinical Presentation (PT Evaluation Complexity) Stable/Uncomplicated   Clinical Presentation Rationale Pt presents as medically diagnosed   Clinical Decision Making (Complexity) low complexity   Planned Therapy Interventions (PT) gait training;home exercise program;patient/family education;stair training;transfer training   Anticipated Equipment Needs at Discharge (PT) walker, rolling   Risk & Benefits of therapy have been explained patient   PT Discharge Planning   PT Discharge Recommendation (DC Rec) (S)  home with assist   PT Rationale for DC Rec Pt ambulating safely w/ FWW, has assist at home, completed stairs safely.   Plan of Care Review   Plan of Care Reviewed With patient   Therapy Certification   Start of care date 09/27/22   Certification date from 09/27/22   Certification date to 10/18/22   Medical Diagnosis S/P R VIOLETA   Total Evaluation Time   Total Evaluation Time (Minutes) 10   Physical Therapy Goals   PT Frequency Daily   PT Predicted Duration/Target Date for Goal Attainment 09/29/22   PT Goals Transfers;Gait;Stairs;PT Goal 1   PT: Transfers Supervision/stand-by assist;Sit to/from stand;Assistive device   PT: Gait Supervision/stand-by assist;Rolling walker;150 feet   PT: Stairs Supervision/stand-by assist;3 stairs;Rail on right   PT: Goal 1 Compliance w/ HEP

## 2022-09-27 NOTE — PLAN OF CARE
Patient vital signs are at baseline: Yes  Patient able to ambulate as they were prior to admission or with assist devices provided by therapies during their stay:  Yes  Patient MUST void prior to discharge:  Yes  Patient able to tolerate oral intake:  Yes  Pain has adequate pain control using Oral analgesics:  Yes  Does patient have an identified :  Yes  Has goal D/C date and time been discussed with patient:  Yes Goal Outcome Evaluation:    Plan of Care Reviewed With: patient     Overall Patient Progress: improving. Patient  complained of mild pain, ambulated through the hallway. And voided. For potential discharge  tomorrow.            Digestive

## 2022-09-27 NOTE — CONSULTS
[facility]  Consult Note - Hospitalist Service  Date of Admission:  9/27/2022  Consult Requested by: Orthopedics Surgery  Reason for Consult: Post-operative medical management, HTN    Assessment & Plan   Artemio Brunson is a 61 year old old male with a history of HTN, HLD, morbid obesity BMI of 43.2, OA underwent right hip surgery with spinal anesthesia. [facility] service was asked to evaluate patient for postoperative medical management as follows below. Please resume the home medications as reconciled and further noted below with ordered hold parameters.  Vital signs have been stable post-operatively including hemodynamically stable blood pressure and heart rate. Thank you for this consult; we will continue to follow this patient until discharge.    HTN  -Order home medications with the written tiered hold parameters given risk for post-operative hypotension. Given ACEi/ARB/diuretic medication, ordered creatine, potassium, and magnesium to evaluate renal function and electrolytes, respectively.      HLD  -Order home statin.    Morbid Obesity  BMI 43.2  -PCP follow-up, outpatient management.      S/p Right Hip Surgery   Hip OA  Acute Post-Op Pain  -Agree with current pain control regimen; consider acute pain team consultation if increasingly difficult to control or proves refractory.   -PT evaluation.   -OT evaluation.  -IS frequently, initially every hour while awake as tolerated. Directly encouraged and discussed.      Post-Op DVT Prophylaxis  -As per primary surgery team.    Procedure(s):  RIGHT DIRECT ANTERIOR TOTAL HIP ARTHROPLASTY  Post-operative Day: Day of Surgery  Code status:Full Code     Type of Anesthesia   Spinal    Estimated Blood Loss:  750 mL    Hospital Problem List   No problem-specific Assessment & Plan notes found for this encounter.    Active Problems:    * No active hospital problems. *      -Reviewed the patient's preoperative H and P and updated missing elements.  -Home  "medication reconciliation has been reviewed.  Medications have been ordered as noted from the home list and changes are documented above          The patient's care was discussed with the Patient and Patient's Family.    Judah Newell MD  [facility]  Securely message with the Vocera Web Console (learn more here)  Text page via [facility] Paging/Directory       Hospitalist Service    Clinically Significant Risk Factors Present on Admission                 # Hypertension: home medication list includes antihypertensive(s)    # Severe Obesity: Estimated body mass index is 42.85 kg/m  as calculated from the following:    Height as of this encounter: 1.854 m (6' 1\").    Weight as of this encounter: 147.3 kg (324 lb 12.8 oz).        ______________________________________________________________________    Chief Complaint   Post-Op Med Management, HTN    History is obtained from the patient    History of Present Illness   Artemio Brunson is a 61 year old old male with a history of HTN, HLD, morbid obesity BMI of 43.2, OA underwent right hip surgery with spinal anesthesia. [facility] service was asked to evaluate patient for postoperative medical management as follows below.     He has been in his usual state of health prior to presenting for this elective surgery.  He denies any associated symptoms prior to coming in today, and also denies any recent travel domestically or internationally, and also denies any recent sick contacts around him including any family or friends who have been sick.  When asked, he denies any fevers, rigors, chest pain or shortness of breath, nausea or vomiting or abdominal pain, changes in bowel movements/pattern, urinary symptoms, focal weakness, or any other new and associated symptoms at this time.  He has been compliant with his medications.  14 point review of systems performed with the patient without any other pertinent positives at this time.    Presently, he states his " pain is currently well-tolerated.  He denies any new complaints or symptoms at this time.     His social history, family history, and past medical history were directly reviewed with him and corroborated to be accurate as documented below. All questions he had at this time were answered to his verbalized and stated satisfaction and understanding.       Review of Systems   The 10 point Review of Systems is negative other than noted in the HPI or here.     Past Medical History    I have reviewed this patient's medical history and updated it with pertinent information if needed.   Past Medical History:   Diagnosis Date     Arthritis      Hay fever      Hypertension      Irregular heart beat     PVCs     Motion sickness        Past Surgical History   I have reviewed this patient's surgical history and updated it with pertinent information if needed.  History reviewed. No pertinent surgical history.    Social History   I have reviewed this patient's social history and updated it with pertinent information if needed.  Social History     Tobacco Use     Smoking status: Never Smoker     Smokeless tobacco: Never Used   Substance Use Topics     Alcohol use: Yes     Comment: 2-4 tray./week     Drug use: Yes     Types: Marijuana     Comment: medical marijuana for pain       Family History   No significant family history, including no history of: CA, CAD or other genetic conditions reported when reviewed.    Medications   I have reviewed this patient's current medications  Medications Prior to Admission   Medication Sig Dispense Refill Last Dose     acetaminophen (TYLENOL) 500 MG tablet Take 500 mg by mouth every 4 hours as needed for mild pain   9/27/2022 at 0400     Bromelain 250 MG CAPS Take 250 mg by mouth daily   Past Week at Unknown time     lisinopril (ZESTRIL) 10 MG tablet Take 10 mg by mouth daily   9/26/2022 at Unknown time     multivitamin w/minerals (THERA-VIT-M) tablet Take 1 tablet by mouth daily   Past Week at  Unknown time     naproxen (NAPROSYN) 250 MG tablet Take 250 mg by mouth daily   9/20/2022     psyllium (METAMUCIL/KONSYL) capsule Take 1 capsule by mouth daily   Past Week at Unknown time     Quercetin 250 MG TABS Take 250 mg by mouth daily   Past Week at Unknown time     traMADol (ULTRAM) 50 MG tablet Take 50 mg by mouth 2 times daily   9/27/2022 at 0400     triamterene-HCTZ (MAXZIDE) 75-50 MG tablet Take 1 tablet by mouth daily   9/26/2022 at Unknown time     vitamin B-Complex Take 1 tablet by mouth daily   Past Week at Unknown time     Vitamin D3 (CHOLECALCIFEROL) 25 mcg (1000 units) tablet Take 1 tablet by mouth daily   Past Week at Unknown time     zinc gluconate 50 MG tablet Take 50 mg by mouth daily   Past Week at Unknown time       Allergies   Allergies   Allergen Reactions     Animal Dander      Pollen Extract        Physical Exam   Vital Signs: Temp: 97.5  F (36.4  C) Temp src: Axillary BP: 120/60 Pulse: 69   Resp: 16 SpO2: 98 % O2 Device: None (Room air) Oxygen Delivery: 8 LPM  Weight: 324 lbs 12.8 oz    GENERAL:  Alert, appears comfortable, in no acute distress, appears stated age, sitting up in chair eating dinner/lunch without issue   HEAD:  Normocephalic, without obvious abnormality, atraumatic   EYES:  PERRL, conjunctiva/corneas clear, no scleral icterus, EOM's intact   NOSE: Nares normal, septum midline, mucosa normal, no drainage   THROAT: Lips, mucosa, and tongue normal; teeth and gums normal, mouth moist   NECK: Supple, symmetrical, trachea midline   BACK:   Symmetric, no curvature, ROM normal   LUNGS:   Clear to auscultation bilaterally, no rales, rhonchi, or wheezing, symmetric chest rise on inhalation, respirations unlabored   CHEST WALL:  No tenderness or deformity   HEART:  Regular rate and rhythm, S1 and S2 normal, no murmur, rub, or gallop    ABDOMEN:   Soft, non-tender, bowel sounds active all four quadrants, no masses, no organomegaly, no rebound or guarding   EXTREMITIES: Extremities  normal, atraumatic, no cyanosis or edema    SKIN: Dry to touch, no exanthems in the visualized areas   NEURO: Alert, oriented x 4, moves all four extremities freely/spontaneously   PSYCH: Cooperative, behavior is appropriate        Data   I personally reviewed the EKG tracing showing NSR.  Results for orders placed or performed during the hospital encounter of 09/27/22 (from the past 24 hour(s))   ABO/Rh type and screen    Narrative    The following orders were created for panel order ABO/Rh type and screen.  Procedure                               Abnormality         Status                     ---------                               -----------         ------                     Adult Type and Screen[[id number]]                            Final result                 Please view results for these tests on the individual orders.   Adult Type and Screen   Result Value Ref Range    ABO/RH(D) A POS     Antibody Screen Negative Negative    SPECIMEN EXPIRATION DATE [id number]    XR Surgery NAHID L/T 5 Min Fluoro    Narrative    This exam was marked as non-reportable because it will not be read by a   radiologist or a [place] non-radiologist provider.         XR Pelvis w Hip Port Right 1 View    Narrative    EXAM: XR PELVIS AND HIP PORTABLE RIGHT 1 VIEW  LOCATION: [facility]  DATE/TIME: 9/27/2022 11:58 AM    INDICATION: Status post hip surgery.  COMPARISON: None.      Impression    IMPRESSION: There is mild superolateral subluxation of the right hip joint with right hip arthroplasty. No true dislocation. Left hip negative for fracture. Postprocedural air within the soft tissues lateral to the right hip.             XR Pelvis w Hip Port Right 1 View    Narrative    EXAM: XR PELVIS AND HIP PORTABLE RIGHT 1 VIEW  LOCATION: [facility]  DATE/TIME: 9/27/2022 12:26 PM    INDICATION: Post Op R Hip  COMPARISON: None.      Impression    IMPRESSION: Postoperative change right  total hip arthroplasty. Components appear well seated. Left hip negative for fracture. Visualized pelvis negative.     Most Recent 3 CBC's:No lab results found.  Most Recent 3 BMP's:No lab results found.  Most Recent 2 LFT's:No lab results found.

## 2022-09-28 ENCOUNTER — APPOINTMENT (OUTPATIENT)
Dept: PHYSICAL THERAPY | Facility: CLINIC | Age: 62
End: 2022-09-28
Attending: ORTHOPAEDIC SURGERY
Payer: COMMERCIAL

## 2022-09-28 ENCOUNTER — APPOINTMENT (OUTPATIENT)
Dept: OCCUPATIONAL THERAPY | Facility: CLINIC | Age: 62
End: 2022-09-28
Attending: PHYSICIAN ASSISTANT
Payer: COMMERCIAL

## 2022-09-28 VITALS
RESPIRATION RATE: 18 BRPM | WEIGHT: 315 LBS | HEART RATE: 75 BPM | TEMPERATURE: 99.1 F | DIASTOLIC BLOOD PRESSURE: 72 MMHG | HEIGHT: 73 IN | BODY MASS INDEX: 41.75 KG/M2 | OXYGEN SATURATION: 98 % | SYSTOLIC BLOOD PRESSURE: 140 MMHG

## 2022-09-28 LAB
ANION GAP SERPL CALCULATED.3IONS-SCNC: 8 MMOL/L (ref 5–18)
BUN SERPL-MCNC: 23 MG/DL (ref 8–22)
CALCIUM SERPL-MCNC: 8.5 MG/DL (ref 8.5–10.5)
CHLORIDE BLD-SCNC: 107 MMOL/L (ref 98–107)
CO2 SERPL-SCNC: 21 MMOL/L (ref 22–31)
CREAT SERPL-MCNC: 0.79 MG/DL (ref 0.7–1.3)
GFR SERPL CREATININE-BSD FRML MDRD: >90 ML/MIN/1.73M2
GLUCOSE BLD-MCNC: 107 MG/DL (ref 70–125)
HGB BLD-MCNC: 10.7 G/DL (ref 13.3–17.7)
MAGNESIUM SERPL-MCNC: 2.2 MG/DL (ref 1.8–2.6)
POTASSIUM BLD-SCNC: 4.1 MMOL/L (ref 3.5–5)
SODIUM SERPL-SCNC: 136 MMOL/L (ref 136–145)

## 2022-09-28 PROCEDURE — 99214 OFFICE O/P EST MOD 30 MIN: CPT | Performed by: HOSPITALIST

## 2022-09-28 PROCEDURE — 36415 COLL VENOUS BLD VENIPUNCTURE: CPT | Performed by: HOSPITALIST

## 2022-09-28 PROCEDURE — 82435 ASSAY OF BLOOD CHLORIDE: CPT | Performed by: HOSPITALIST

## 2022-09-28 PROCEDURE — 97166 OT EVAL MOD COMPLEX 45 MIN: CPT | Mod: GO

## 2022-09-28 PROCEDURE — 250N000013 HC RX MED GY IP 250 OP 250 PS 637: Performed by: PHYSICIAN ASSISTANT

## 2022-09-28 PROCEDURE — 85018 HEMOGLOBIN: CPT | Performed by: PHYSICIAN ASSISTANT

## 2022-09-28 PROCEDURE — 250N000013 HC RX MED GY IP 250 OP 250 PS 637: Performed by: HOSPITALIST

## 2022-09-28 PROCEDURE — 97535 SELF CARE MNGMENT TRAINING: CPT | Mod: GO

## 2022-09-28 PROCEDURE — 97116 GAIT TRAINING THERAPY: CPT | Mod: GP

## 2022-09-28 PROCEDURE — 83735 ASSAY OF MAGNESIUM: CPT | Performed by: HOSPITALIST

## 2022-09-28 PROCEDURE — 250N000011 HC RX IP 250 OP 636: Performed by: PHYSICIAN ASSISTANT

## 2022-09-28 RX ORDER — GABAPENTIN 100 MG/1
300 CAPSULE ORAL AT BEDTIME
Qty: 30 CAPSULE | Refills: 0 | Status: SHIPPED | OUTPATIENT
Start: 2022-09-28

## 2022-09-28 RX ADMIN — LISINOPRIL 10 MG: 10 TABLET ORAL at 08:21

## 2022-09-28 RX ADMIN — OXYCODONE HYDROCHLORIDE 5 MG: 5 TABLET ORAL at 05:18

## 2022-09-28 RX ADMIN — TRIAMTERENE AND HYDROCHLOROTHIAZIDE 1 TABLET: 75; 50 TABLET ORAL at 08:22

## 2022-09-28 RX ADMIN — ASPIRIN 81 MG: 81 TABLET, COATED ORAL at 08:21

## 2022-09-28 RX ADMIN — POLYETHYLENE GLYCOL 3350 17 G: 17 POWDER, FOR SOLUTION ORAL at 08:20

## 2022-09-28 RX ADMIN — SENNOSIDES AND DOCUSATE SODIUM 1 TABLET: 50; 8.6 TABLET ORAL at 08:20

## 2022-09-28 RX ADMIN — KETOROLAC TROMETHAMINE 15 MG: 15 INJECTION, SOLUTION INTRAMUSCULAR; INTRAVENOUS at 11:14

## 2022-09-28 RX ADMIN — KETOROLAC TROMETHAMINE 15 MG: 15 INJECTION, SOLUTION INTRAMUSCULAR; INTRAVENOUS at 05:04

## 2022-09-28 RX ADMIN — Medication 1 CAPSULE: at 08:24

## 2022-09-28 RX ADMIN — ACETAMINOPHEN 975 MG: 325 TABLET, FILM COATED ORAL at 05:04

## 2022-09-28 RX ADMIN — MULTIPLE VITAMINS W/ MINERALS TAB 1 TABLET: TAB at 08:21

## 2022-09-28 ASSESSMENT — ACTIVITIES OF DAILY LIVING (ADL)
ADLS_ACUITY_SCORE: 36
ADLS_ACUITY_SCORE: 36
ADLS_ACUITY_SCORE: 34
ADLS_ACUITY_SCORE: 36
ADLS_ACUITY_SCORE: 36
ADLS_ACUITY_SCORE: 34

## 2022-09-28 NOTE — PROGRESS NOTES
[facility]    Medicine Consult Progress Note - Hospitalist Service    Date of Admission:  9/27/2022    Assessment & Plan            Artemio Brunson is a 61 year old old male with a history of HTN, HLD, morbid obesity BMI of 43.2, OA underwent right hip surgery with spinal anesthesia. [facility] service was asked to evaluate patient for postoperative medical management as follows below. Please resume the home medications as reconciled and further noted below with ordered hold parameters.  Vital signs have been stable post-operatively including hemodynamically stable blood pressure and heart rate. Thank you for this consult; we will continue to follow this patient until discharge.    HTN  -Order home medications with the written tiered hold parameters given risk for post-operative hypotension. Given ACEi/ARB/diuretic medication, checked creatine, potassium, and magnesium to evaluate renal function and electrolytes, respectively.   -Labs were all unremarkable/WNL     HLD  -Order home statin.    Morbid Obesity  BMI 43.2  -PCP follow-up, outpatient management.      S/p Right Hip Surgery   Hip OA  Acute Post-Op Pain  -Agree with current pain control regimen; consider acute pain team consultation if increasingly difficult to control or proves refractory.   -PT evaluation.   -OT evaluation.  -IS frequently, initially every hour while awake as tolerated. Directly encouraged and discussed.      Post-Op DVT Prophylaxis  -As per primary surgery team.    Procedure(s):  RIGHT DIRECT ANTERIOR TOTAL HIP ARTHROPLASTY  Post-operative Day: Day of Surgery  Code status:Full Code     Type of Anesthesia   Spinal    Estimated Blood Loss:  750 mL    Hospital Problem List   No problem-specific Assessment & Plan notes found for this encounter.    Active Problems:    * No active hospital problems. *      -Reviewed the patient's preoperative H and P and updated missing elements.  -Home medication reconciliation has been  "reviewed.  Medications have been ordered as noted from the home list and changes are documented above            Diet: Advance Diet as Tolerated: Regular Diet Adult  Discharge Instruction - Regular Diet Adult    DVT Prophylaxis: Defer to primary service  Mai Catheter: Not present  Central Lines: None  Cardiac Monitoring: None  Code Status: Full Code      Disposition Plan      Expected Discharge Date: 09/28/2022, 11:00 AM              >40 mins with 50% of the floor time (for inpatient hospital services) spent providing counseling or coordination of care (C/CC).  This includes stabilizing the patient's hemodynamics including blood pressure and heart rate, formulating the appropriate care plan for today, and also extensive counseling regarding disease management, lifestyle modifications, and medication regimen with the patient and/or caregivers. Coordination of care for outpatient programs and resources is also crucial and discussed with patient and/or caregivers and completed updated medication reconciliation for safe discharge.     The patient's care was discussed with the Patient.    Judah Newell MD  Hospitalist Service  [facility]  Securely message with the Vocera Web Console (learn more here)  Text page via [facility] Paging/Directory         Clinically Significant Risk Factors Present on Admission                 # Hypertension: home medication list includes antihypertensive(s)    # Severe Obesity: Estimated body mass index is 42.85 kg/m  as calculated from the following:    Height as of this encounter: 1.854 m (6' 1\").    Weight as of this encounter: 147.3 kg (324 lb 12.8 oz).        ______________________________________________________________________    Interval History   No acute events overnight.    No report of chest pain, shortness of breath, or other new complaints. Medication reconciliation completed for anticipated discharge and discussed with patient as well as counseling " regarding lifestyle modifications and behaviors in setting of post-operative recovery in the coming weeks, outpatient rehabilitative follow-up plan pending final PT/OT recommendations, and follow-up in clinic with the primary care provider and with surgery as scheduled. All questions were answered to stated and verbalized satisfaction.       Data reviewed today: I reviewed all medications, new labs and imaging results over the last 24 hours. I personally reviewed .    Physical Exam   Vital Signs: Temp: 99.1  F (37.3  C) Temp src: Oral BP: (!) 140/72 Pulse: 75   Resp: 18 SpO2: 98 % O2 Device: None (Room air) Oxygen Delivery: 8 LPM  Weight: 324 lbs 12.8 oz  GENERAL:  Alert, appears comfortable, in no acute distress, appears stated age   HEAD:  Normocephalic, without obvious abnormality, atraumatic   EYES:  PERRL, conjunctiva/corneas clear, no scleral icterus, EOM's intact   NOSE: Nares normal, septum midline, mucosa normal, no drainage   THROAT: Lips, mucosa, and tongue normal; teeth and gums normal, mouth moist   NECK: Supple, symmetrical, trachea midline   BACK:   Symmetric, no curvature, ROM normal   LUNGS:   Clear to auscultation bilaterally, no rales, rhonchi, or wheezing, symmetric chest rise on inhalation, respirations unlabored   CHEST WALL:  No tenderness or deformity   HEART:  Regular rate and rhythm, S1 and S2 normal, no murmur, rub, or gallop    ABDOMEN:   Soft, non-tender, bowel sounds active all four quadrants, no masses, no organomegaly, no rebound or guarding   EXTREMITIES: Extremities normal, atraumatic, no cyanosis or edema    SKIN: Dry to touch, no exanthems in the visualized areas   NEURO: Alert, oriented x 4, moves all four extremities freely/spontaneously   PSYCH: Cooperative, behavior is appropriate        Data   Recent Labs   Lab 09/28/22  0540   HGB 10.7*      POTASSIUM 4.1   CHLORIDE 107   CO2 21*   BUN 23*   CR 0.79   ANIONGAP 8   CHELSIE 8.5        Recent Results (from the past 24  hour(s))   XR Surgery NAHID L/T 5 Min Fluoro    Narrative    This exam was marked as non-reportable because it will not be read by a   radiologist or a [place] non-radiologist provider.         XR Pelvis w Hip Port Right 1 View    Narrative    EXAM: XR PELVIS AND HIP PORTABLE RIGHT 1 VIEW  LOCATION: [facility]  DATE/TIME: 9/27/2022 11:58 AM    INDICATION: Status post hip surgery.  COMPARISON: None.      Impression    IMPRESSION: There is mild superolateral subluxation of the right hip joint with right hip arthroplasty. No true dislocation. Left hip negative for fracture. Postprocedural air within the soft tissues lateral to the right hip.             XR Pelvis w Hip Port Right 1 View    Narrative    EXAM: XR PELVIS AND HIP PORTABLE RIGHT 1 VIEW  LOCATION: [facility]  DATE/TIME: 9/27/2022 12:26 PM    INDICATION: Post Op R Hip  COMPARISON: None.      Impression    IMPRESSION: Postoperative change right total hip arthroplasty. Components appear well seated. Left hip negative for fracture. Visualized pelvis negative.     Medications     lactated ringers 75 mL/hr at 09/27/22 1304       acetaminophen  975 mg Oral Q8H     aspirin  81 mg Oral BID     ketorolac  15 mg Intravenous Q6H     lisinopril  10 mg Oral Daily     multivitamin w/minerals  1 tablet Oral Daily     polyethylene glycol  17 g Oral Daily     psyllium  1 capsule Oral Daily     senna-docusate  1 tablet Oral BID     sodium chloride (PF)  3 mL Intracatheter Q8H     triamterene-HCTZ  1 tablet Oral Daily

## 2022-09-28 NOTE — PROGRESS NOTES
Care Management Initial Consult    General Information  Assessment completed with: VM-chart review,    Type of CM/SW Visit: Chart Assessment      Additional Information:  SW reviewed chart and therapy recommendations.  Pt lives in a home with his son who will provide support upon discharge.  No further OP or home therapy recommended.  No CM needs identified at this time.  CM available to assist as needed with discharge planning.     JUSTIN Carey

## 2022-09-28 NOTE — PLAN OF CARE
Goal Outcome Evaluation:    Plan of Care Reviewed With: patient     Overall Patient Progress: improving    Outcome Evaluation: Patient up with assist of 1, tolerates well. Reports plans to discharge to home after am therapy/    Patient vital signs are at baseline: Yes  Patient able to ambulate as they were prior to admission or with assist devices provided by therapies during their stay:  Yes  Patient MUST void prior to discharge:  Yes  Patient able to tolerate oral intake:  Yes  Pain has adequate pain control using Oral analgesics:  Yes  Does patient have an identified :  Yes  Has goal D/C date and time been discussed with patient:  Yes    PRN Oxycodone administered for pain with some relief noted. CMS intact. Vitals stable.

## 2022-09-28 NOTE — PROGRESS NOTES
Will not see today:  PAIN MANAGEMENT SERVICE CHART CHECK    This patient's chart has been reviewed by the Pain Service. It has been determined that no change is necessary to the pain management regimen at this time. Plans for discharge. Pain team will sign off. Discussed with Orthopedics. If you would like the patient to be seen, please contact the service and ask to have the patient seen.    Thank you!      Zoe MCNAMARA, FNP-C  Acute Care Pain Management Program  [facility] ([place], [place], [place])  Monday-Friday 8a-4p   Page via online paging system  or call [phone]

## 2022-09-28 NOTE — CONSULTS
"ACUPUNCTURIST TREATMENT NOTE    Name: Artemio Brunson  :  1960  MRN:  [id number]    Acupuncture Treatment  Patient Type: Orthopedic  Intervention Reason: Pain  Pain Location: (R) hip  Pre-session Pain Rating: 3  Post-session Pain Ratin  Patient complaint:: (R) hip pain  Number of needles inserted: 4  Number of needles removed: 4         \"Risks and benefits of acupuncture were discussed with patient. Consent for treatment was given. We thank you for the referral.\"     Abena Montes L.Ac.      Date:  2022  Time:  11:27 AM    "

## 2022-09-28 NOTE — PROGRESS NOTES
Occupational Therapy Discharge Summary    Reason for therapy discharge:    All goals and outcomes met, no further needs identified.    Progress towards therapy goal(s). See goals on Care Plan in [facility] electronic health record for goal details.  Goals met    Therapy recommendation(s):    No further therapy is recommended.

## 2022-09-28 NOTE — DISCHARGE SUMMARY
"Discharge instruction and education, including \"stop light\" tool provided to pt. Pt and family acknowledge understanding. Pt discharge to home with family transport.   "

## 2022-09-28 NOTE — DISCHARGE SUMMARY
ORTHOPEDIC DISCHARGE SUMMARY       Artemio Brunson,  1960, MRN [id number]    Admission Date: 2022      Admission Diagnoses: Hip osteoarthritis [M16.9]  Right hip pain [M25.551]  S/P total hip arthroplasty [Z96.649]     Discharge Date:  2022    Post-operative Day:  1 Day Post-Op    Reason for Admission: The patient was admitted for the following: Procedure(s):  RIGHT DIRECT ANTERIOR TOTAL HIP ARTHROPLASTY    BRIEF HOSPITAL COURSE   Artemio Brunson is a pleasant 61 year old male who underwent the aforementioned procedure with Dr. Baugh on 2022. There were no intraoperative complications and the patient was transferred to the recovery room and later the orthopedic unit in stable condition. Once the patient reached the orthopedic floor our orthopedic pain protocol was implemented along with the following:    Anticoagulation Medications: ASA  Therapy: PT and OT Avoid any external rotation of the right leg  Activity: WBAT  Bracing: None    Consultations during Admission: Hospitalist service for medical management     COMPLICATIONS/SIGNIFICANT FINDINGS    None    DISCHARGE INFORMATION   Condition at discharge: Good  Discharge destination: Home  Patient was seen by myself  on the date of discharge.    FOLLOW UP CARE   Follow up with orthopedics in 2 weeks or sooner should the need arise. Ortho will continue to manage pain control, post op anticoagulation and incision care.     Follow up with your PCP for management of chronic medical problems and to evaluate for post op medical complications including constipation, nausea/vomiting, DVT/PE, anemia, changes in blood pressure, fevers/chills, urinary retention and atelectasis/pneumonia.     Subjective   Patient is doing well on POD #1. Pain is well controlled with oral medications. Ambulating. Tolerating oral intake. Voiding.    Physical Exam   BP (!) 140/72 (BP Location: Left arm)   Pulse 75   Temp 99.1  F (37.3  C) (Oral)   Resp 18   Ht 1.854  "m (6' 1\")   Wt 147.3 kg (324 lb 12.8 oz)   SpO2 98%   BMI 42.85 kg/m    The patient is A&Ox3. Appears comfortable, sitting up at bedside & dressed.  Calves are soft and non-tender bilaterally  Brisk capillary refill in the toes.   Palpable Right dorsalis pedis pulse. Foot warm & well-perfused.  Sensation is intact to light touch & equal bilaterally in the femoral, DP, SP & tibial nerve distributions.  ROM: Flexes at Right hip. Appropriately flexes & extends all toes bilaterally.   Motor: +5/5 dorsiflexion, plantar flexion & EHL bilaterally. Fires quad.   Leg lengths appear equal.  Right hip dressing C/D/I without strikethrough, no surrounding erythema.     Pertinent Results at Discharge     Hemoglobin   Date/Time Value Ref Range Status   09/28/2022 05:40 AM 10.7 (L) 13.3 - 17.7 g/dL Final   08/20/2009 02:45 PM 14.9 13.3 - 17.7 g/dL Final     Platelet Count   Date/Time Value Ref Range Status   08/20/2009 02:45  150 - 450 10e9/L Final       Problem List   Principal Problem:    Status post total right knee replacement  Active Problems:    Primary hypertension    Mixed hyperlipidemia    BMI 40.0-44.9, adult (H)    Primary osteoarthritis of right hip    Debbie Souza PA-C/Dr. Baugh  [facility]  [phone]  Date: 9/28/2022  Time: 10:38 AM    "

## 2022-09-28 NOTE — PROGRESS NOTES
09/28/22 0815   Quick Adds   Type of Visit Initial Occupational Therapy Evaluation   Living Environment   People in Home child(jennifer), adult   Current Living Arrangements house  (Able to live on one level.)   Transportation Anticipated family or friend will provide   Living Environment Comments WALLACE wisammi built in chair, RTS with cabinet on the R and door jam on the L.   Self-Care   Usual Activity Tolerance good   Current Activity Tolerance moderate   Equipment Currently Used at Home cane, straight   General Information   Onset of Illness/Injury or Date of Surgery 09/27/22   Referring Physician Dr. José Baugh   Patient/Family Therapy Goal Statement (OT) To return home today.  Adult children to assist.   Additional Occupational Profile Info/Pertinent History of Current Problem PMH:  HTN, morbid obesity   Right Lower Extremity (Weight-bearing Status) weight-bearing as tolerated (WBAT)   Cognitive Status Examination   Orientation Status orientation to person, place and time   Follows Commands WNL   Bed Mobility   Bed Mobility supine-sit;sit-supine   Supine-Sit [place] (Bed Mobility) supervision;verbal cues   Sit-Supine [place] (Bed Mobility) supervision;verbal cues   Transfers   Transfers bed-chair transfer;sit-stand transfer;toilet transfer;shower transfer   Transfer Skill: Bed to Chair/Chair to Bed   Bed-Chair [place] (Transfers) supervision;verbal cues   Assistive Device (Bed-Chair Transfers) rolling walker   Sit-Stand Transfer   Sit-Stand [place] (Transfers) verbal cues;supervision   Assistive Device (Sit-Stand Transfers) walker, front-wheeled   Shower Transfer   Type (Shower Transfer)   (Therapist demo  technique.  Pt understood.)   Toilet Transfer   Type (Toilet Transfer) sit-stand;stand-sit   [place] Level (Toilet Transfer) supervision;verbal cues   Assistive Device (Toilet Transfer) raised toilet seat;grab bars/safety frame   Balance   Balance Assessment no deficits were identified    Activities of Daily Living   BADL Assessment/Intervention upper body dressing;lower body dressing;grooming;toileting   Upper Body Dressing Assessment/Training   [place] Level (Upper Body Dressing) independent   Lower Body Dressing Assessment/Training   Position (Lower Body Dressing) supported sitting;supported standing   Assistive Devices (Lower Body Dressing) reacher;long-handled shoe horn   [place] Level (Lower Body Dressing) minimum assist (75% patient effort)   Grooming Assessment/Training   Position (Grooming) supported standing   [place] Level (Grooming) supervision;verbal cues   Toileting   Position (Toileting) supported sitting;supported standing   Assistive Devices (Toileting) raised toilet seat;grab bar, toilet   [place] Level (Toileting) supervision;verbal cues   Clinical Impression   Criteria for Skilled Therapeutic Interventions Met (OT) Yes, treatment indicated   OT Diagnosis decreased indep with ADLs due to VIOLETA   OT Problem List-Impairments impacting ADL mobility   Assessment of Occupational Performance 3-5 Performance Deficits   Identified Performance Deficits dressing, trsfs., toileting, bathing   Planned Therapy Interventions (OT) ADL retraining   Clinical Decision Making Complexity (OT) moderate complexity   Anticipated Equipment Needs Upon Discharge (OT) other (see comments)  (reacher)   Risk & Benefits of therapy have been explained evaluation/treatment results reviewed;participants included;patient   OT Discharge Planning   OT Discharge Recommendation (DC Rec) (S)  home with assist   OT Rationale for DC Rec Will  have assist from his adult children   Therapy Certification   Start of Care Date 09/28/22   Certification date from 09/28/22   Certification date to 10/28/22   Medical Diagnosis VIOLETA   Total Evaluation Time (Minutes)   Total Evaluation Time (Minutes) 15   OT Goals   Therapy Frequency (OT) Daily   OT Predicted Duration/Target Date for Goal Attainment 09/28/22   OT  Goals Lower Body Dressing;Transfers   OT: Lower Body Dressing Modified independent;Completed   OT: Transfer Modified independent;Completed

## 2022-09-28 NOTE — PROGRESS NOTES
"Orthopedic Progress Note      Assessment: 1 Day Post-Op  S/P Procedure(s):  RIGHT DIRECT ANTERIOR TOTAL HIP ARTHROPLASTY     Plan:   - Continue PT/OT.   - Weightbearing status: WBAT with walker.  - Avoid any external rotation of the right leg. Discussed this with patient and he understands to avoid ER until seen in follow-up.  - Anticoagulation: aspirin in addition to SCDs, janice stockings and early ambulation.  - Antibiotic prophylaxis: Duricef 500 mg po bid x7 days  - Nystatin powder to groin BID x 2 weeks  - Discharge planning: Discharge to home today.     Subjective:  Pain: Well controlled on aspirin, Tylenol, toradol, oxy  Nausea, Vomiting:  No  Chest pain: No  Lightheadedness, Dizziness:  No  Neuro:  Patient denies new onset numbness or paresthesias in Right lower extremity     Patient doing well on POD #1. No feeling of a clunk or new pain in Right hip other than surgical pain. Ambulating, tolerating oral intake, voiding & pain is controlled with oral medications. Hgb 10.7.     Objective:  BP (!) 140/72 (BP Location: Left arm)   Pulse 75   Temp 99.1  F (37.3  C) (Oral)   Resp 18   Ht 1.854 m (6' 1\")   Wt 147.3 kg (324 lb 12.8 oz)   SpO2 98%   BMI 42.85 kg/m    The patient is A&Ox3. Appears comfortable, sitting up at bedside & dressed.  Calves are soft and non-tender bilaterally  Brisk capillary refill in the toes.   Palpable Right dorsalis pedis pulse. Foot warm & well-perfused.  Sensation is intact to light touch & equal bilaterally in the femoral, DP, SP & tibial nerve distributions.  ROM: Flexes at Right hip. Appropriately flexes & extends all toes bilaterally.   Motor: +5/5 dorsiflexion, plantar flexion & EHL bilaterally. Fires quad.   Leg lengths appear equal.  Right hip dressing C/D/I without strikethrough, no surrounding erythema.     Pertinent Labs   Lab Results: personally reviewed.   No results found for: INR, PROTIME  Lab Results   Component Value Date    WBC 6.5 08/20/2009    HGB 10.7 (L) " 09/28/2022    HCT 43.2 08/20/2009    MCV 86 08/20/2009     08/20/2009     Lab Results   Component Value Date     09/28/2022    CO2 21 (L) 09/28/2022     Report completed by:  Debbie Souza PA-C  [facility]    Date: 9/28/2022  Time: 10:32 AM

## 2022-09-28 NOTE — PROGRESS NOTES
[facility]      OUTPATIENT OCCUPATIONAL THERAPY  EVALUATION  PLAN OF TREATMENT FOR OUTPATIENT REHABILITATION  (COMPLETE FOR INITIAL CLAIMS ONLY)  Patient's Last Name, First Name, M.I.  YOB: 1960  Artemio Brunson                          Provider's Name  [facility] Medical Record No.  [id number]                               Onset Date:  09/27/22   Start of Care Date:  09/28/22     Type:     ___PT   _X_OT   ___SLP Medical Diagnosis:  VIOLETA                        OT Diagnosis:  decreased indep with ADLs due to VIOLETA   Visits from SOC:  1   _________________________________________________________________________________  Plan of Treatment/Functional Goals    Planned Interventions: ADL retraining   Goals: See Occupational Therapy Goals on Care Plan in [employer] electronic health record.    Therapy Frequency: Daily  Predicted Duration of Therapy Intervention: 09/28/22  _________________________________________________________________________________    I CERTIFY THE NEED FOR THESE SERVICES FURNISHED UNDER        THIS PLAN OF TREATMENT AND WHILE UNDER MY CARE     (Physician co-signature of this document indicates review and certification of the therapy plan).              Certification date from: 09/28/22, Certification date to: 10/28/22    Referring Physician: Dr. José Baugh            Initial Assessment        See Occupational Therapy evaluation dated 09/28/22 in [employer] electronic health record.

## 2022-09-28 NOTE — PLAN OF CARE
Patient vital signs are at baseline: Yes  Patient able to ambulate as they were prior to admission or with assist devices provided by therapies during their stay:  Yes  Patient MUST void prior to discharge:  Yes  Patient able to tolerate oral intake:  Yes  Pain has adequate pain control using Oral analgesics:  Yes  Does patient have an identified :  Yes  Has goal D/C date and time been discussed with patient:  Yes    Discharge went over by discharge nurse. Pt discharged to home with family in stable condition.

## 2022-09-28 NOTE — PROGRESS NOTES
Physical Therapy Discharge Summary    Reason for therapy discharge:    All goals and outcomes met, no further needs identified.    Progress towards therapy goal(s). See goals on Care Plan in [facility] electronic health record for goal details.  Goals met    Therapy recommendation(s):    Continue home exercise program.

## 2022-10-23 ENCOUNTER — HEALTH MAINTENANCE LETTER (OUTPATIENT)
Age: 62
End: 2022-10-23

## 2023-11-11 ENCOUNTER — HEALTH MAINTENANCE LETTER (OUTPATIENT)
Age: 63
End: 2023-11-11

## 2024-12-28 ENCOUNTER — HEALTH MAINTENANCE LETTER (OUTPATIENT)
Age: 64
End: 2024-12-28

## (undated) DEVICE — SUTURE VICRYL+ 2-0 27IN CT-1 UND VCP259H

## (undated) DEVICE — SOL NACL 0.9% IRRIG 1000ML BOTTLE 2F7124

## (undated) DEVICE — SU MONOCRYL 3-0 PS-2 18" UND MCP497G

## (undated) DEVICE — GLOVE BIOGEL PI SZ 8.5 40885

## (undated) DEVICE — MAT FLOOR SURGICAL 40X38 0702140238

## (undated) DEVICE — SUCTION SLEEVE NEPTUNE 2 165MM 0703-005-165

## (undated) DEVICE — DECANTER VIAL 2006S

## (undated) DEVICE — SUCTION MANIFOLD NEPTUNE 2 SYS 4 PORT 0702-020-000

## (undated) DEVICE — GLOVE BIOGEL INDICATOR 7.5 LF 41675

## (undated) DEVICE — ESU ELEC BLADE 6" COATED E1450-6

## (undated) DEVICE — DRAPE IOBAN ISOLATION VERTICAL 320X21CM 6617

## (undated) DEVICE — SOL WATER IRRIG 1000ML BOTTLE 2F7114

## (undated) DEVICE — SU STRATAFIX PDS PLUS 1 CT-1 18" SXPP1A404

## (undated) DEVICE — ADH SKIN CLOSURE PREMIERPRO EXOFIN 1.0ML 3470

## (undated) DEVICE — SU STRATAFIX PDS PLUS 2-0 SPIRAL CT-1 30CM SXPP1B410

## (undated) DEVICE — CUSTOM PACK ANTERIOR HIP SOP5BAHHED

## (undated) DEVICE — BONE CLEANING TIP INTERPULSE  0210-010-000

## (undated) DEVICE — BLADE PRECISION 25X1.27X9 6725127090

## (undated) DEVICE — GOWN IMPERVIOUS BREATHABLE 2XL/XLONG

## (undated) DEVICE — PLATE GROUNDING ADULT W/CORD 9165L

## (undated) DEVICE — DRAPE SHEET REV FOLD 3/4 9349

## (undated) DEVICE — KIT PATIENT CARE HANA TABLE PROFX SUPINE 6855

## (undated) DEVICE — SUCTION IRR SYSTEM W/O TIP INTERPULSE HANDPIECE 0210-100-000

## (undated) DEVICE — GLOVE BIOGEL PI ULTRATOUCH G SZ 7.5 42175

## (undated) DEVICE — SOL NACL 0.9% INJ 1000ML BAG 2B1324X

## (undated) DEVICE — DRAPE STERI TOWEL LG 1010

## (undated) DEVICE — Device

## (undated) DEVICE — SU ETHIBOND 5 LRDA 30" B499T

## (undated) DEVICE — DRESSING MEPILEX BORDER POST-OP 4X10

## (undated) DEVICE — PADDING CAST 4IN WEBRIL STRL 2502

## (undated) DEVICE — PREP CHLORAPREP 26ML TINTED HI-LITE ORANGE 930815

## (undated) DEVICE — NEEDLE SPINAL DISP 18X3 QUINCKE BD 5174

## (undated) DEVICE — SUTURE VICRYL+ 1 27IN CT-1 UND VCP261H

## (undated) DEVICE — GLOVE UNDER INDICATOR PI SZ 8.5 LF 41685

## (undated) DEVICE — SU FIBERWIRE 2 38" T-8 NDL  AR-7206

## (undated) DEVICE — GLOVE BIOGEL PI INDICATOR 8.0 LF 41680

## (undated) DEVICE — GLOVE BIOGEL PI SZ 7.5 40875

## (undated) RX ORDER — DEXMEDETOMIDINE HYDROCHLORIDE 4 UG/ML
INJECTION, SOLUTION INTRAVENOUS
Status: DISPENSED
Start: 2022-09-27

## (undated) RX ORDER — PROPOFOL 10 MG/ML
INJECTION, EMULSION INTRAVENOUS
Status: DISPENSED
Start: 2022-09-27

## (undated) RX ORDER — PHENYLEPHRINE HYDROCHLORIDE 10 MG/ML
INJECTION INTRAVENOUS
Status: DISPENSED
Start: 2022-09-27

## (undated) RX ORDER — EPHEDRINE SULFATE 50 MG/ML
INJECTION, SOLUTION INTRAMUSCULAR; INTRAVENOUS; SUBCUTANEOUS
Status: DISPENSED
Start: 2022-09-27

## (undated) RX ORDER — FENTANYL CITRATE 50 UG/ML
INJECTION, SOLUTION INTRAMUSCULAR; INTRAVENOUS
Status: DISPENSED
Start: 2022-09-27

## (undated) RX ORDER — ONDANSETRON 2 MG/ML
INJECTION INTRAMUSCULAR; INTRAVENOUS
Status: DISPENSED
Start: 2022-09-27

## (undated) RX ORDER — DEXAMETHASONE SODIUM PHOSPHATE 10 MG/ML
INJECTION, EMULSION INTRAMUSCULAR; INTRAVENOUS
Status: DISPENSED
Start: 2022-09-27